# Patient Record
Sex: FEMALE | Race: BLACK OR AFRICAN AMERICAN | NOT HISPANIC OR LATINO | Employment: FULL TIME | URBAN - METROPOLITAN AREA
[De-identification: names, ages, dates, MRNs, and addresses within clinical notes are randomized per-mention and may not be internally consistent; named-entity substitution may affect disease eponyms.]

---

## 2019-05-24 ENCOUNTER — HOSPITAL ENCOUNTER (OUTPATIENT)
Facility: HOSPITAL | Age: 39
Discharge: HOME | End: 2019-05-25
Attending: FAMILY MEDICINE | Admitting: OBSTETRICS & GYNECOLOGY
Payer: COMMERCIAL

## 2019-05-24 DIAGNOSIS — D62 ACUTE BLOOD LOSS ANEMIA: ICD-10-CM

## 2019-05-24 DIAGNOSIS — N93.9 VAGINAL BLEEDING: ICD-10-CM

## 2019-05-24 DIAGNOSIS — O02.1 ABORTION, MISSED: Primary | ICD-10-CM

## 2019-05-24 PROCEDURE — 99285 EMERGENCY DEPT VISIT HI MDM: CPT | Mod: 25

## 2019-05-25 VITALS
OXYGEN SATURATION: 99 % | DIASTOLIC BLOOD PRESSURE: 63 MMHG | BODY MASS INDEX: 28.35 KG/M2 | HEART RATE: 75 BPM | WEIGHT: 160 LBS | RESPIRATION RATE: 18 BRPM | TEMPERATURE: 98 F | HEIGHT: 63 IN | SYSTOLIC BLOOD PRESSURE: 116 MMHG

## 2019-05-25 PROBLEM — N93.9 VAGINAL BLEEDING: Status: ACTIVE | Noted: 2019-05-25

## 2019-05-25 PROBLEM — D62 ACUTE BLOOD LOSS ANEMIA: Status: ACTIVE | Noted: 2019-05-25

## 2019-05-25 PROBLEM — O02.1 ABORTION, MISSED: Status: ACTIVE | Noted: 2019-05-25

## 2019-05-25 LAB
ABO + RH BLD: NORMAL
BASOPHILS # BLD AUTO: 0.04 K/UL (ref 0–0.2)
BASOPHILS NFR BLD: 0.5 % (ref 0–1.9)
BLD GP AB SCN CELLS X3 SERPL QL: NORMAL
DIFFERENTIAL METHOD: ABNORMAL
EOSINOPHIL # BLD AUTO: 0.1 K/UL (ref 0–0.5)
EOSINOPHIL NFR BLD: 1.5 % (ref 0–8)
ERYTHROCYTE [DISTWIDTH] IN BLOOD BY AUTOMATED COUNT: 13.6 % (ref 11.5–14.5)
HCG INTACT+B SERPL-ACNC: 511 MIU/ML
HCG INTACT+B SERPL-ACNC: 829 MIU/ML
HCT VFR BLD AUTO: 32.3 % (ref 37–48.5)
HCT VFR BLD AUTO: 36.7 % (ref 37–48.5)
HCT VFR BLD AUTO: 38 % (ref 37–48.5)
HGB BLD-MCNC: 10.2 G/DL (ref 12–16)
HGB BLD-MCNC: 11.7 G/DL (ref 12–16)
HGB BLD-MCNC: 12.1 G/DL (ref 12–16)
IMM GRANULOCYTES # BLD AUTO: 0.01 K/UL (ref 0–0.04)
IMM GRANULOCYTES NFR BLD AUTO: 0.1 % (ref 0–0.5)
LYMPHOCYTES # BLD AUTO: 2.1 K/UL (ref 1–4.8)
LYMPHOCYTES NFR BLD: 28.5 % (ref 18–48)
MCH RBC QN AUTO: 28 PG (ref 27–31)
MCHC RBC AUTO-ENTMCNC: 31.9 G/DL (ref 32–36)
MCV RBC AUTO: 88 FL (ref 82–98)
MONOCYTES # BLD AUTO: 0.6 K/UL (ref 0.3–1)
MONOCYTES NFR BLD: 8.5 % (ref 4–15)
NEUTROPHILS # BLD AUTO: 4.4 K/UL (ref 1.8–7.7)
NEUTROPHILS NFR BLD: 60.9 % (ref 38–73)
NRBC BLD-RTO: 0 /100 WBC
PLATELET # BLD AUTO: 281 K/UL (ref 150–350)
PMV BLD AUTO: 10.6 FL (ref 9.2–12.9)
RBC # BLD AUTO: 4.18 M/UL (ref 4–5.4)
WBC # BLD AUTO: 7.3 K/UL (ref 3.9–12.7)

## 2019-05-25 PROCEDURE — G0378 HOSPITAL OBSERVATION PER HR: HCPCS

## 2019-05-25 PROCEDURE — 84702 CHORIONIC GONADOTROPIN TEST: CPT

## 2019-05-25 PROCEDURE — 96360 HYDRATION IV INFUSION INIT: CPT

## 2019-05-25 PROCEDURE — 84702 CHORIONIC GONADOTROPIN TEST: CPT | Mod: 91

## 2019-05-25 PROCEDURE — 85014 HEMATOCRIT: CPT

## 2019-05-25 PROCEDURE — 85025 COMPLETE CBC W/AUTO DIFF WBC: CPT

## 2019-05-25 PROCEDURE — 25000003 PHARM REV CODE 250: Performed by: FAMILY MEDICINE

## 2019-05-25 PROCEDURE — 85018 HEMOGLOBIN: CPT

## 2019-05-25 PROCEDURE — 86850 RBC ANTIBODY SCREEN: CPT

## 2019-05-25 PROCEDURE — 96361 HYDRATE IV INFUSION ADD-ON: CPT

## 2019-05-25 RX ORDER — OXYCODONE AND ACETAMINOPHEN 7.5; 325 MG/1; MG/1
1 TABLET ORAL EVERY 4 HOURS PRN
Qty: 20 TABLET | Refills: 0 | Status: SHIPPED | OUTPATIENT
Start: 2019-05-25 | End: 2020-05-24

## 2019-05-25 RX ORDER — MISOPROSTOL 200 UG/1
200 TABLET ORAL EVERY 4 HOURS
Qty: 12 TABLET | Refills: 0 | Status: SHIPPED | OUTPATIENT
Start: 2019-05-25 | End: 2020-12-15

## 2019-05-25 RX ORDER — SODIUM CHLORIDE 9 MG/ML
INJECTION, SOLUTION INTRAVENOUS CONTINUOUS
Status: DISCONTINUED | OUTPATIENT
Start: 2019-05-25 | End: 2019-05-25 | Stop reason: HOSPADM

## 2019-05-25 RX ORDER — ONDANSETRON 2 MG/ML
4 INJECTION INTRAMUSCULAR; INTRAVENOUS EVERY 8 HOURS PRN
Status: DISCONTINUED | OUTPATIENT
Start: 2019-05-25 | End: 2019-05-25 | Stop reason: HOSPADM

## 2019-05-25 RX ORDER — HYDROMORPHONE HYDROCHLORIDE 2 MG/ML
0.5 INJECTION, SOLUTION INTRAMUSCULAR; INTRAVENOUS; SUBCUTANEOUS
Status: DISCONTINUED | OUTPATIENT
Start: 2019-05-25 | End: 2019-05-25

## 2019-05-25 RX ORDER — HYDROMORPHONE HYDROCHLORIDE 2 MG/ML
0.5 INJECTION, SOLUTION INTRAMUSCULAR; INTRAVENOUS; SUBCUTANEOUS EVERY 4 HOURS PRN
Status: DISCONTINUED | OUTPATIENT
Start: 2019-05-25 | End: 2019-05-25 | Stop reason: HOSPADM

## 2019-05-25 RX ORDER — DOXYCYCLINE 100 MG/1
100 CAPSULE ORAL EVERY 12 HOURS
Qty: 14 CAPSULE | Refills: 1 | Status: SHIPPED | OUTPATIENT
Start: 2019-05-25 | End: 2019-06-01

## 2019-05-25 RX ADMIN — SODIUM CHLORIDE: 9 INJECTION, SOLUTION INTRAVENOUS at 03:05

## 2019-05-25 NOTE — ED NOTES
Dr Dennis at bedside. Pelvic exam performed with KEREN Hester RN present. Vaginal bleeding noted with clots. Pt tolerated well.

## 2019-05-25 NOTE — ED NOTES
Dr Dennis at bedside for repeat pelvic exam. Vaginal bleeding still noted with several clots. Tolerated well.

## 2019-05-25 NOTE — H&P
[facility]  Obstetrics & Gynecology       Obsevation note    Patient Name: Emanuel Callaway  MRN: [id number]  Admission Date: 2019  Primary Care Provider: Primary Doctor No    Subjective:     Chief Complaint/Reason for Admission:  Early pregnancy with vaginal bleeding    History of Present Illness:  38-year-old  with an IUP at approximately 8 weeks by LMP presents to the emergency room with complaints of heavy vaginal bleeding. The patient was [facility] in early April and told that she was pregnant.  She did not have an ultrasound.  Ultrasound in the ER showed an early gestational sac with no fetal pole.  The patient was observed throughout the night.  Her bleeding had significantly improved.    No current facility-administered medications on file prior to encounter.      No current outpatient medications on file prior to encounter.       Review of patient's allergies indicates:  No Known Allergies    Past medical history:  Chronic hypertension.    Past Surgical History:   Procedure Laterality Date     SECTION       Family History     None        Tobacco Use    Smoking status: Never Smoker   Substance and Sexual Activity    Alcohol use: Yes     Comment: socially    Drug use: Not on file    Sexual activity: Not on file     Review of Systems   Constitutional: Negative.    HENT: Negative.    Eyes: Negative.    Respiratory: Negative.    Cardiovascular: Negative.    Gastrointestinal: Negative.    Endocrine: Negative.    Genitourinary: Positive for vaginal bleeding.   Musculoskeletal: Negative.    Integumentary:  Negative.   Neurological: Negative.    Hematological: Negative.    Psychiatric/Behavioral: Negative.    Breast: negative.      Objective:     Vital Signs (Most Recent):  Temp: 97.8 °F (36.6 °C) (19)  Pulse: 79 (19)  Resp: 16 (19)  BP: 131/89 (19)  SpO2: 99 % (19) Vital Signs (24h  Range):  Temp:  [97.8 °F (36.6 °C)-98 °F (36.7 °C)] 97.8 °F (36.6 °C)  Pulse:  [] 79  Resp:  [16-18] 16  SpO2:  [97 %-99 %] 99 %  BP: (131-137)/(89-97) 131/89     Weight: 72.6 kg (160 lb)  Body mass index is 28.34 kg/m².  No LMP recorded (lmp unknown). Patient is pregnant.    Physical Exam:   Constitutional: She is oriented to person, place, and time. She appears well-developed and well-nourished.    HENT:   Head: Normocephalic and atraumatic.   Right Ear: External ear normal.   Left Ear: External ear normal.   Nose: Nose normal.   Mouth/Throat: Oropharynx is clear and moist.    Eyes: Pupils are equal, round, and reactive to light. Conjunctivae and EOM are normal.    Neck: Normal range of motion. Neck supple.    Cardiovascular: Normal rate, regular rhythm, normal heart sounds and intact distal pulses.     Pulmonary/Chest: Effort normal and breath sounds normal.        Abdominal: Soft. Bowel sounds are normal.     Genitourinary: Uterus normal. Pelvic exam was performed with patient prone. Cervix is normal. Right adnexum displays no mass, no tenderness and no fullness. Left adnexum displays no mass, no tenderness and no fullness. There is bleeding in the vagina.   Genitourinary Comments: The cervix is closed.  There is no cervical motion tenderness. There is no uterine fundal tenderness. There is minimal bleeding from the cervical os.           Musculoskeletal: Normal range of motion and moves all extremeties.       Neurological: She is alert and oriented to person, place, and time. She has normal reflexes.    Skin: Skin is warm and dry.    Psychiatric: She has a normal mood and affect. Her behavior is normal. Judgment and thought content normal.       Laboratory:  Beta HC / 511  A+ blood type  CBC:   Recent Labs   Lab 19  0016  19  0623   WBC 7.30  --   --    RBC 4.18  --   --    HGB 11.7*   < > 10.2*   HCT 36.7*   < > 32.3*     --   --    MCV 88  --   --    MCH 28.0  --   --    MCHC  31.9*  --   --     < > = values in this interval not displayed.     I have personallly reviewed all pertinent lab results from the last 24 hours.    Diagnostic Results:  US: Reviewed  Small intrauterine gestational sac noted.  No fetal heart tones    Assessment/Plan:     Active Diagnoses:    Diagnosis Date Noted POA    Vaginal bleeding [N93.9] 2019 Yes    , missed [O02.1] 2019 Yes      Problems Resolved During this Admission:       Discussed expectant management versus Cytotec versus D&C.  I will have patient return to clinic on Tuesday.  Patient has decided to proceed with Cytotec.  I will discharge her with vaginal Cytotec x2 doses then oral Cytotec.  I will also discharge her with a prescription for Percocet and doxycycline.  She is to return to my office on Tuesday for transvaginal ultrasound.  Bleeding precautions and fever precautions given.    Juanito Day MD  Obstetrics & Gynecology  [facility]

## 2019-05-25 NOTE — NURSING
Pt given d/c instructions. Pt understands when to follow up and what signs and symptoms to report to MD sooner. Pt understands how to take meds at home. Questions answered. Pt states she can have a ride around 1000am to pick her up for discharge. Pt instructions to call if she has any needs or questions.

## 2019-05-25 NOTE — ED PROVIDER NOTES
Encounter Date: 2019       History     Chief Complaint   Patient presents with    Vaginal Bleeding     reports vaginal bleeding with clots onset yesterday-increased today    Abdominal Pain     describes as cramping. reports being approximately 2 months pregnant     Patient presents the ED with having vaginal bleeding since around 5:00 p.m. today.  Patient's last menstrual period was approximately 2019, patient states that she is approximately 6-7 weeks pregnant.  She is a  AB1.  Has not establish prenatal care as of yet.  Patient states that cramping and spotting began yesterday but the bleeding became very heavy today.  Passing several large clots.  Denies any dizziness or faintness.        Review of patient's allergies indicates:  No Known Allergies  History reviewed. No pertinent past medical history.  Past Surgical History:   Procedure Laterality Date     SECTION       No family history on file.  Social History     Tobacco Use    Smoking status: Never Smoker   Substance Use Topics    Alcohol use: Yes     Comment: socially    Drug use: Not on file     Review of Systems   Constitutional: Negative.  Negative for fever.   HENT: Negative.    Eyes: Negative.    Respiratory: Negative.    Cardiovascular: Negative.    Gastrointestinal: Positive for abdominal pain.   Endocrine: Negative.    Genitourinary: Positive for vaginal bleeding.   Musculoskeletal: Negative.    Allergic/Immunologic: Negative.    Neurological: Negative.  Negative for dizziness, weakness and light-headedness.   Hematological: Negative.    Psychiatric/Behavioral: Negative.        Physical Exam     Initial Vitals [19 2338]   BP Pulse Resp Temp SpO2   (!) 137/97 96 18 98 °F (36.7 °C) 97 %      MAP       --         Physical Exam    Nursing note and vitals reviewed.  Constitutional: She appears well-developed and well-nourished. She is not diaphoretic. No distress.   HENT:   Head: Normocephalic and atraumatic.   Eyes:  Conjunctivae and EOM are normal. Pupils are equal, round, and reactive to light.   Neck: Normal range of motion. Neck supple.   Cardiovascular: Normal rate.   Pulmonary/Chest: Breath sounds normal. No respiratory distress. She has no wheezes. She has no rales.   Abdominal: Soft. Bowel sounds are normal. She exhibits no distension. There is no tenderness.   Genitourinary: Vagina normal.   Genitourinary Comments: Attempted to perform speculum exam, unable to visualize cervix due to brisk bleeding, large amount of blood and clots in vaginal vault.   Musculoskeletal: Normal range of motion. She exhibits no edema.   Neurological: She is alert and oriented to person, place, and time. She has normal strength.   Skin: Skin is warm and dry. Capillary refill takes less than 2 seconds. No rash noted. No erythema.   Psychiatric: She has a normal mood and affect. Her behavior is normal. Judgment and thought content normal.         ED Course   Procedures  Labs Reviewed   HCG, QUANTITATIVE, PREGNANCY   CBC W/ AUTO DIFFERENTIAL          Imaging Results    None                            ED Course as of May 25 0419   Sat May 25, 2019   0140 Spoke with Dr. Day, will admit, do serial H&H, repeat HCG and NPO in anticipation of D&C later this am.     [MD]      ED Course User Index  [MD] Sarah Dennis MD     Clinical Impression:       ICD-10-CM ICD-9-CM   1. Vaginal bleeding N93.9 623.8                                Sarah Dennis MD  05/25/19 2077

## 2019-05-25 NOTE — ED NOTES
Pt here with complains of abdominal cramping and heavy vaginal bleeding onset 1730. Pt reports being approximately 2 months pregnant. OB appointment scheduled for Tuesday. Pt awake and alert. Respirations non labored. Skin warm and dry.

## 2019-05-25 NOTE — DISCHARGE INSTRUCTIONS
Notify your healthcare provider if you experience: temperature greater than 100.4, increased vaginal bleeding with clots, lightheadedness, or dizziness.      Cytotec: Place one tablet vaginally and then repeat vaginal dose 4 hours later. After those two doses, starting taking 1 tablet orally for a total of 12 doses.   For pain, you may also take 800mg of Ibuprofen every 8 hours as needed for pain.

## 2019-05-26 NOTE — DISCHARGE SUMMARY
[facility]  Obstetrics & Gynecology  Discharge Summary    Patient Name: Emanuel Callaway  MRN: [id number]  Admission Date: 2019  Hospital Length of Stay: 0 days  Discharge Date and Time: 2019 11:45 AM  Attending Physician: No att. providers found   Discharging Provider: Juanito Day MD  Primary Care Provider: Primary Doctor Masha    HPI:  38-year-old  with an IUP at 8 weeks presented with heavy vaginal bleeding.  A transvaginal ultrasound revealed an empty gestational sac that was intrauterine.  The patient had serial beta HCGs drawn.  There were noted to be declining.  The patient was diagnosed with a missed AB.  Hospital Course:  The patient was placed in observation.  Repeat beta HCGs were drawn as well as a CBC.  The patient's hematocrit noted to decrease somewhat.  She was counseled on the diagnosis.  The patient decided to proceed with medical treatment with Cytotec.  She was given fever and bleeding precautions.    * No surgery found *     Consults:     Significant Diagnostic Studies: Labs:   CBC   Recent Labs   Lab 19  0016 19  0158 19  0623   WBC 7.30  --   --    HGB 11.7* 12.1 10.2*   HCT 36.7* 38.0 32.3*     --   --     and All labs within the past 24 hours have been reviewed  Radiology: Ultrasound:  Gestational sac with no fetal pole.    Pending Diagnostic Studies:     None        Final Active Diagnoses:    Diagnosis Date Noted POA    PRINCIPAL PROBLEM:  , missed [O02.1] 2019 Yes    Vaginal bleeding [N93.9] 2019 Yes    Acute blood loss anemia [D62] 2019 Yes      Problems Resolved During this Admission:        Discharged Condition: good    Disposition: Home or Self Care    Follow Up:  Follow-up Information     Juanito Day MD.    Specialty:  Obstetrics  Why:  On Tuesday for transvaginal ultrasound  Contact information:  [facility] [facility] [address] [phone]                  Patient Instructions:      Other restrictions (specify):   Order Comments: Pelvic rest: no sex tampons or douching     Notify your health care provider if you experience any of the following:  temperature >100.4     Notify your health care provider if you experience any of the following:  persistent nausea and vomiting or diarrhea     Notify your health care provider if you experience any of the following:  severe persistent headache     Notify your health care provider if you experience any of the following:  persistent dizziness, light-headedness, or visual disturbances     Notify your health care provider if you experience any of the following:   Order Comments: 1.  Unusual vaginal discharge.  2.  Pain or burning with urination  3.  Swelling of face, hands, legs,etc that occurs suddenly     Medications:  Reconciled Home Medications:      Medication List      START taking these medications    doxycycline 100 MG capsule  Commonly known as:  MONODOX  Take 1 capsule (100 mg total) by mouth every 12 (twelve) hours. MAY REFILL ONCE for 7 days     miSOPROStol 200 MCG Tab  Commonly known as:  CYTOTEC  Place 1 tablet (200 mcg total) vaginally every 4 (four) hours. Place the 1st 2 tablets inside the vagina and then continue with taking the tablets orally after that. for 12 doses     oxyCODONE-acetaminophen 7.5-325 mg per tablet  Commonly known as:  PERCOCET  Take 1 tablet by mouth every 4 (four) hours as needed for Pain.            Juanito Day MD  Obstetrics & Gynecology  [facility]

## 2019-12-15 ENCOUNTER — HOSPITAL ENCOUNTER (EMERGENCY)
Facility: HOSPITAL | Age: 39
Discharge: HOME | End: 2019-12-15
Attending: EMERGENCY MEDICINE
Payer: COMMERCIAL

## 2019-12-15 VITALS
OXYGEN SATURATION: 99 % | HEART RATE: 100 BPM | HEIGHT: 63 IN | BODY MASS INDEX: 31.54 KG/M2 | DIASTOLIC BLOOD PRESSURE: 77 MMHG | RESPIRATION RATE: 20 BRPM | WEIGHT: 178 LBS | SYSTOLIC BLOOD PRESSURE: 108 MMHG | TEMPERATURE: 98 F

## 2019-12-15 DIAGNOSIS — N30.90 CYSTITIS: Primary | ICD-10-CM

## 2019-12-15 DIAGNOSIS — J32.9 SINUSITIS, UNSPECIFIED CHRONICITY, UNSPECIFIED LOCATION: ICD-10-CM

## 2019-12-15 PROBLEM — K30 INDIGESTION: Status: ACTIVE | Noted: 2019-12-15

## 2019-12-15 PROBLEM — F43.0 ACUTE STRESS DISORDER: Status: ACTIVE | Noted: 2019-12-15

## 2019-12-15 PROBLEM — F32.A DEPRESSIVE DISORDER: Status: ACTIVE | Noted: 2019-12-15

## 2019-12-15 PROBLEM — I10 HYPERTENSION: Status: ACTIVE | Noted: 2019-12-15

## 2019-12-15 LAB
BACTERIA #/AREA URNS HPF: ABNORMAL /HPF
BILIRUB UR QL STRIP: NEGATIVE
CLARITY UR: ABNORMAL
COLOR UR: YELLOW
GLUCOSE UR QL STRIP: NEGATIVE
HGB UR QL STRIP: ABNORMAL
KETONES UR QL STRIP: NEGATIVE
LEUKOCYTE ESTERASE UR QL STRIP: ABNORMAL
MICROSCOPIC COMMENT: ABNORMAL
NITRITE UR QL STRIP: NEGATIVE
PH UR STRIP: 5 [PH] (ref 5–8)
PROT UR QL STRIP: NEGATIVE
RBC #/AREA URNS HPF: 10 /HPF (ref 0–4)
SP GR UR STRIP: 1.02 (ref 1–1.03)
URN SPEC COLLECT METH UR: ABNORMAL
UROBILINOGEN UR STRIP-ACNC: NEGATIVE EU/DL
WBC #/AREA URNS HPF: 35 /HPF (ref 0–5)

## 2019-12-15 PROCEDURE — 87077 CULTURE AEROBIC IDENTIFY: CPT

## 2019-12-15 PROCEDURE — 81000 URINALYSIS NONAUTO W/SCOPE: CPT

## 2019-12-15 PROCEDURE — 87186 SC STD MICRODIL/AGAR DIL: CPT

## 2019-12-15 PROCEDURE — 87086 URINE CULTURE/COLONY COUNT: CPT

## 2019-12-15 PROCEDURE — 63600175 PHARM REV CODE 636 W HCPCS: Performed by: NURSE PRACTITIONER

## 2019-12-15 PROCEDURE — 96372 THER/PROPH/DIAG INJ SC/IM: CPT

## 2019-12-15 PROCEDURE — 87088 URINE BACTERIA CULTURE: CPT

## 2019-12-15 PROCEDURE — 99284 EMERGENCY DEPT VISIT MOD MDM: CPT | Mod: 25

## 2019-12-15 RX ORDER — DOXYCYCLINE 100 MG/1
100 CAPSULE ORAL 2 TIMES DAILY
Qty: 20 CAPSULE | Refills: 0 | Status: SHIPPED | OUTPATIENT
Start: 2019-12-15 | End: 2019-12-25

## 2019-12-15 RX ORDER — KETOROLAC TROMETHAMINE 30 MG/ML
60 INJECTION, SOLUTION INTRAMUSCULAR; INTRAVENOUS
Status: COMPLETED | OUTPATIENT
Start: 2019-12-15 | End: 2019-12-15

## 2019-12-15 RX ADMIN — KETOROLAC TROMETHAMINE 60 MG: 30 INJECTION, SOLUTION INTRAMUSCULAR at 05:12

## 2019-12-15 NOTE — ED PROVIDER NOTES
CHIEF COMPLAINT  Chief Complaint   Patient presents with    Dysuria     uti symptoms and congestion       HPI  Emanuel Tucker a 39 y.o. female who presents urinary urgency and frequency for 2 days. Does not think she has had any fever. No vaginal discharge.   Sinus congestion and pressure for a few days. No fever/chills. Has taken OTC meds with no improvement in symptoms.      CURRENT MEDICATIONS  No current facility-administered medications on file prior to encounter.      Current Outpatient Medications on File Prior to Encounter   Medication Sig Dispense Refill    DULoxetine 60 mg CDRS duloxetine 60 mg capsule,delayed release      miSOPROStol (CYTOTEC) 200 MCG Tab Place 1 tablet (200 mcg total) vaginally every 4 (four) hours. Place the 1st 2 tablets inside the vagina and then continue with taking the tablets orally after that. for 12 doses 12 tablet 0    oxyCODONE-acetaminophen (PERCOCET) 7.5-325 mg per tablet Take 1 tablet by mouth every 4 (four) hours as needed for Pain. 20 tablet 0       ALLERGIES  Review of patient's allergies indicates:  No Known Allergies    Immunization History   Administered Date(s) Administered    Tdap 2018       PAST MEDICAL HISTORY  History reviewed. No pertinent past medical history.    SURGICAL HISTORY  Past Surgical History:   Procedure Laterality Date     SECTION         SOCIAL HISTORY  Social History     Socioeconomic History    Marital status: Single     Spouse name: Not on file    Number of children: Not on file    Years of education: Not on file    Highest education level: Not on file   Occupational History    Not on file   Social Needs    Financial resource strain: Not on file    Food insecurity:     Worry: Not on file     Inability: Not on file    Transportation needs:     Medical: Not on file     Non-medical: Not on file   Tobacco Use    Smoking status: Never Smoker   Substance and Sexual Activity    Alcohol use: Yes     Comment: socially     "Drug use: Not on file    Sexual activity: Not on file   Lifestyle    Physical activity:     Days per week: Not on file     Minutes per session: Not on file    Stress: Not on file   Relationships    Social connections:     Talks on phone: Not on file     Gets together: Not on file     Attends Faith service: Not on file     Active member of club or organization: Not on file     Attends meetings of clubs or organizations: Not on file     Relationship status: Not on file   Other Topics Concern    Not on file   Social History Narrative    Not on file       FAMILY HISTORY  History reviewed. No pertinent family history.    REVIEW OF SYSTEMS  Constitutional: No fever, chills, or weakness.  Eyes: No redness, pain, or discharge  HENT: No ear pain, no headache, no rhinorrhea, no throat pain  Respiratory: No cough, wheezing or shortness of breath  Cardiovascular: No chest pain, palpitations or edema  GI: No abdominal pain, nausea, vomiting or diarrhea  Gu: + dysuria, +  Hematuria, + frequency, no discharge  Musculoskeletal: No pain, full range of motion. Good sensation  Skin: No rash or abrasion  Neurologic: No focal weakness or sensory changes.  All systems otherwise negative except as noted in the Review of Systems and History of Present Illness      PHYSICAL EXAM  Reviewed Triage Note  VITAL SIGNS:   Patient Vitals for the past 24 hrs:   BP Temp Pulse Resp SpO2 Height Weight   12/15/19 1625 108/77 98.4 °F (36.9 °C) 100 20 99 % 5' 3" (1.6 m) 80.7 kg (178 lb)     Constitutional: Well developed, well nourished, Alert and oriented x3, No acute distress, non-toxic appearance.  HENT: Normocephalic, Atraumatic, TM's full/dull bilaterally. Pharynx with purulent PND. + frontal and maxillary tenderness to palpation.   Eyes: PERRL, EOMI, Conjunctiva normal, No discharge.  Neck: Normal range of motion, no tenderness, supple  Respiratory: Normal breath sounds, no respiratory distress, no wheezing, no rhonchi, no " rales  Cardiovascular: Normal heart rate, normal rhythm, no murmurs, no rubs, no gallops.  Gi: Bowel sounds normal, soft, no tenderness, non-distended, no masses  Musculoskeletal: No edema, no tenderness, no cyanosis, no clubbing. Good range of motion in all major joints. No tenderness to palpation or major deformities noted.   Integument: Warm, Dry, No erythema, no rash  Neurologic: Normal motor function, normal sensory function. No focal deficits noted. I  Psychiatric: Affect normal, judgment normal, mood normal      LABS  Pertinent labs reviewed. (see chart for details)  Labs Reviewed   URINALYSIS, REFLEX TO URINE CULTURE - Abnormal; Notable for the following components:       Result Value    Appearance, UA Hazy (*)     Occult Blood UA 3+ (*)     Leukocytes, UA 1+ (*)     All other components within normal limits    Narrative:     Preferred Collection Type->Urine, Clean Catch   URINALYSIS MICROSCOPIC - Abnormal; Notable for the following components:    RBC, UA 10 (*)     WBC, UA 35 (*)     Bacteria Few (*)     All other components within normal limits    Narrative:     Preferred Collection Type->Urine, Clean Catch   CULTURE, URINE       RADIOLOGY  No orders to display         PROCEDURE  Procedures      ED COURSE & MEDICAL DECISION MAKING     MDM       Physical exam findings discussed with patient. No acute emergent medical condition identified at this time to warrant further testing. Will dispo home with instructions to follow up with PCP, return to the ED for worsening condition. Pt agrees with plan of care.     DISPOSITION  Patient discharged in stable condition 12/15/2019  5:51 PM      CLINICAL IMPRESSION:  The primary encounter diagnosis was Cystitis. A diagnosis of Sinusitis, unspecified chronicity, unspecified location was also pertinent to this visit.    Patient advised to follow-up with your PCP within 3 days for BP re-check if Blood Pressure was >120/80 without history of hypertension.       Justa REAGAN  Omar, RUSSELL  12/15/19 8832

## 2019-12-18 LAB — BACTERIA UR CULT: ABNORMAL

## 2020-12-10 ENCOUNTER — TELEPHONE (OUTPATIENT)
Dept: FAMILY MEDICINE | Facility: CLINIC | Age: 40
End: 2020-12-10

## 2020-12-10 DIAGNOSIS — R53.83 FATIGUE, UNSPECIFIED TYPE: Primary | ICD-10-CM

## 2020-12-10 DIAGNOSIS — Z00.00 ROUTINE GENERAL MEDICAL EXAMINATION AT A HEALTH CARE FACILITY: ICD-10-CM

## 2020-12-15 ENCOUNTER — OFFICE VISIT (OUTPATIENT)
Dept: FAMILY MEDICINE | Facility: CLINIC | Age: 40
End: 2020-12-15
Payer: COMMERCIAL

## 2020-12-15 VITALS
SYSTOLIC BLOOD PRESSURE: 122 MMHG | TEMPERATURE: 98 F | DIASTOLIC BLOOD PRESSURE: 80 MMHG | HEIGHT: 64 IN | WEIGHT: 184.38 LBS | OXYGEN SATURATION: 99 % | BODY MASS INDEX: 31.48 KG/M2 | HEART RATE: 109 BPM

## 2020-12-15 DIAGNOSIS — I10 ESSENTIAL HYPERTENSION: ICD-10-CM

## 2020-12-15 DIAGNOSIS — Z00.00 WELLNESS EXAMINATION: ICD-10-CM

## 2020-12-15 DIAGNOSIS — B37.31 VAGINAL CANDIDIASIS: ICD-10-CM

## 2020-12-15 DIAGNOSIS — G47.00 INSOMNIA, UNSPECIFIED TYPE: Primary | ICD-10-CM

## 2020-12-15 DIAGNOSIS — R53.83 FATIGUE, UNSPECIFIED TYPE: ICD-10-CM

## 2020-12-15 PROCEDURE — 1126F PR PAIN SEVERITY QUANTIFIED, NO PAIN PRESENT: ICD-10-PCS | Mod: S$GLB,,,

## 2020-12-15 PROCEDURE — 1126F AMNT PAIN NOTED NONE PRSNT: CPT | Mod: S$GLB,,,

## 2020-12-15 PROCEDURE — 3008F BODY MASS INDEX DOCD: CPT | Mod: S$GLB,,,

## 2020-12-15 PROCEDURE — 99214 PR OFFICE/OUTPT VISIT, EST, LEVL IV, 30-39 MIN: ICD-10-PCS | Mod: S$GLB,,,

## 2020-12-15 PROCEDURE — 99214 OFFICE O/P EST MOD 30 MIN: CPT | Mod: S$GLB,,,

## 2020-12-15 PROCEDURE — 3008F PR BODY MASS INDEX (BMI) DOCUMENTED: ICD-10-PCS | Mod: S$GLB,,,

## 2020-12-15 RX ORDER — DULOXETIN HYDROCHLORIDE 60 MG/1
CAPSULE, DELAYED RELEASE ORAL
COMMUNITY
End: 2021-03-22 | Stop reason: SDUPTHER

## 2020-12-15 RX ORDER — DOXEPIN HYDROCHLORIDE 50 MG/1
CAPSULE ORAL
COMMUNITY
End: 2022-05-13

## 2020-12-15 RX ORDER — CYPROHEPTADINE HYDROCHLORIDE 4 MG/1
TABLET ORAL
COMMUNITY
End: 2021-03-22

## 2020-12-15 RX ORDER — FLUCONAZOLE 150 MG/1
TABLET ORAL
Qty: 2 TABLET | Refills: 0 | Status: SHIPPED | OUTPATIENT
Start: 2020-12-15 | End: 2021-03-22

## 2020-12-15 RX ORDER — AMLODIPINE BESYLATE 10 MG/1
TABLET ORAL
COMMUNITY
End: 2021-03-22 | Stop reason: SDUPTHER

## 2020-12-15 NOTE — PROGRESS NOTES
Subjective:       Patient ID: Emanuel Callaway is a 40 y.o. female.    Chief Complaint: Follow-up ( pt forgot to do labs)    Emanuel Callaway is an established patient to [facility].   She is a new patient to me.   Her last visit was on 10/16/2020 for a well visit/ physical for [employer] program where she works   She did not have her labs completed.   When she presented to the clinic on 10/16/2020 she reported complaints of difficulty sleeping.   She was on Trazodone, but reported that it was not working well for sleep- reports she will fall asleep, but will wake up in 2-2/5 hours and feel like she has not taken anything.   She was given Doxepin 50 mg capsule to try. She reports she stopped taking it because she felt that it was not working.  She reports that she would take it around 7 pm and it was kicking in around her bedtime at 9pm. She states she was sleeping around 4-5 hours at night. She reports when she woke up she would be up for <30 minutes and was able to fall back asleep. Reports she co-sleeps with her son.     She reports vaginal itching, odor and cottage cheese discharge for the past few days. Has not tried anything otc for her symptoms         Review of Systems   Constitutional: Negative for activity change, appetite change, chills, diaphoresis, fatigue, fever and unexpected weight change.   HENT: Negative for congestion, ear discharge, ear pain, facial swelling, hearing loss, mouth sores, nosebleeds, postnasal drip, rhinorrhea, sinus pressure, sinus pain, sneezing, sore throat, tinnitus, trouble swallowing and voice change.    Eyes: Negative.  Negative for pain, discharge and visual disturbance.   Respiratory: Negative for apnea, chest tightness, shortness of breath and wheezing.    Cardiovascular: Negative for chest pain, palpitations and leg swelling.   Gastrointestinal: Negative for abdominal distention, abdominal pain, blood in stool, constipation, nausea and vomiting.   Endocrine:  Negative for cold intolerance, heat intolerance and polyuria.   Genitourinary: Positive for vaginal discharge. Negative for difficulty urinating, frequency, pelvic pain, vaginal bleeding and vaginal pain.   Musculoskeletal: Negative for arthralgias, back pain, joint swelling, neck pain and neck stiffness.   Skin: Negative for color change, pallor, rash and wound.   Allergic/Immunologic: Negative for environmental allergies, food allergies and immunocompromised state.   Neurological: Negative for dizziness, tremors, seizures, syncope, speech difficulty, weakness and headaches.   Hematological: Does not bruise/bleed easily.   Psychiatric/Behavioral: Positive for sleep disturbance. Negative for agitation, behavioral problems, confusion and hallucinations. The patient is not nervous/anxious and is not hyperactive.        Patient Active Problem List   Diagnosis    Vaginal bleeding    , missed    Acute blood loss anemia    Acute stress disorder    BV (bacterial vaginosis)    Depressive disorder    HSV (herpes simplex virus) infection    Hypertension    Indigestion    Essential hypertension    Insomnia       Objective:      Physical Exam  Vitals signs and nursing note reviewed.   Constitutional:       Appearance: Normal appearance.   HENT:      Head: Normocephalic and atraumatic.      Nose: Nose normal.      Mouth/Throat:      Mouth: Mucous membranes are moist.   Eyes:      Pupils: Pupils are equal, round, and reactive to light.   Neck:      Musculoskeletal: Normal range of motion and neck supple.   Cardiovascular:      Rate and Rhythm: Normal rate and regular rhythm.      Pulses: Normal pulses.   Pulmonary:      Effort: Pulmonary effort is normal.      Breath sounds: Normal breath sounds.   Abdominal:      General: Abdomen is flat. Bowel sounds are normal.   Musculoskeletal: Normal range of motion.      Right lower leg: No edema.      Left lower leg: No edema.   Skin:     General: Skin is warm and dry.       Capillary Refill: Capillary refill takes less than 2 seconds.   Neurological:      General: No focal deficit present.      Mental Status: She is alert.   Psychiatric:         Attention and Perception: Attention and perception normal.         Mood and Affect: Mood normal.         Speech: Speech normal.         Behavior: Behavior normal.         Thought Content: Thought content normal.         Cognition and Memory: Cognition and memory normal.         Lab Results   Component Value Date    WBC 7.30 05/25/2019    HGB 10.2 (L) 05/25/2019    HCT 32.3 (L) 05/25/2019     05/25/2019     The ASCVD Risk score (Belia URBINA Jr., et al., 2013) failed to calculate for the following reasons:    The 2013 ASCVD risk score is only valid for ages 40 to 79    Assessment:       1. Insomnia, unspecified type    2. Essential hypertension    3. Wellness examination    4. Fatigue, unspecified type    5. Vaginal candidiasis        Plan:       Emanuel was seen today for follow-up.    Diagnoses and all orders for this visit:    Insomnia, unspecified type  Continue Doxepin: Trial of increasing to 2 capsules at bedtime- try this for the next 2 weeks. Notify office if this is helping with sleep so new rx can be submitted.   Essential hypertension  -     Lipid Panel; Future  -     Comprehensive Metabolic Panel; Future    Wellness examination  -     Lipid Panel; Future  -     Comprehensive Metabolic Panel; Future    Fatigue, unspecified type  -     TSH; Future  -     CBC Auto Differential; Future    Vaginal candidiasis  Diflucan as instructed  Cotton underwear  Good sis care discussed  Schedule pelvic/pap smear with GYN    Obtain fasting labs   rtc 3 months routine follow up     No follow-ups on file.

## 2020-12-24 ENCOUNTER — LAB VISIT (OUTPATIENT)
Dept: LAB | Facility: CLINIC | Age: 40
End: 2020-12-24
Payer: COMMERCIAL

## 2020-12-24 DIAGNOSIS — R53.83 FATIGUE, UNSPECIFIED TYPE: ICD-10-CM

## 2020-12-24 DIAGNOSIS — Z00.00 ROUTINE GENERAL MEDICAL EXAMINATION AT A HEALTH CARE FACILITY: ICD-10-CM

## 2020-12-24 LAB
ALBUMIN SERPL BCP-MCNC: 3.9 G/DL (ref 3.5–5.2)
ALP SERPL-CCNC: 64 U/L (ref 55–135)
ALT SERPL W/O P-5'-P-CCNC: 14 U/L (ref 10–44)
ANION GAP SERPL CALC-SCNC: 4 MMOL/L (ref 8–16)
AST SERPL-CCNC: 15 U/L (ref 10–40)
BASOPHILS # BLD AUTO: 0.05 K/UL (ref 0–0.2)
BASOPHILS NFR BLD: 1.2 % (ref 0–1.9)
BILIRUB SERPL-MCNC: 0.8 MG/DL (ref 0.1–1)
BUN SERPL-MCNC: 9 MG/DL (ref 6–20)
CALCIUM SERPL-MCNC: 9 MG/DL (ref 8.7–10.5)
CHLORIDE SERPL-SCNC: 104 MMOL/L (ref 95–110)
CHOLEST SERPL-MCNC: 187 MG/DL (ref 120–199)
CHOLEST/HDLC SERPL: 2.9 {RATIO} (ref 2–5)
CO2 SERPL-SCNC: 31 MMOL/L (ref 23–29)
CREAT SERPL-MCNC: 0.8 MG/DL (ref 0.5–1.4)
DIFFERENTIAL METHOD: ABNORMAL
EOSINOPHIL # BLD AUTO: 0.1 K/UL (ref 0–0.5)
EOSINOPHIL NFR BLD: 1.4 % (ref 0–8)
ERYTHROCYTE [DISTWIDTH] IN BLOOD BY AUTOMATED COUNT: 14 % (ref 11.5–14.5)
EST. GFR  (AFRICAN AMERICAN): >60 ML/MIN/1.73 M^2
EST. GFR  (NON AFRICAN AMERICAN): >60 ML/MIN/1.73 M^2
GLUCOSE SERPL-MCNC: 93 MG/DL (ref 70–110)
HCT VFR BLD AUTO: 38.4 % (ref 37–48.5)
HDLC SERPL-MCNC: 65 MG/DL (ref 40–75)
HDLC SERPL: 34.8 % (ref 20–50)
HGB BLD-MCNC: 12.1 G/DL (ref 12–16)
IMM GRANULOCYTES # BLD AUTO: 0 K/UL (ref 0–0.04)
IMM GRANULOCYTES NFR BLD AUTO: 0 % (ref 0–0.5)
LDLC SERPL CALC-MCNC: 110.2 MG/DL (ref 63–159)
LYMPHOCYTES # BLD AUTO: 1.7 K/UL (ref 1–4.8)
LYMPHOCYTES NFR BLD: 39.4 % (ref 18–48)
MCH RBC QN AUTO: 27.6 PG (ref 27–31)
MCHC RBC AUTO-ENTMCNC: 31.5 G/DL (ref 32–36)
MCV RBC AUTO: 88 FL (ref 82–98)
MONOCYTES # BLD AUTO: 0.4 K/UL (ref 0.3–1)
MONOCYTES NFR BLD: 9.2 % (ref 4–15)
NEUTROPHILS # BLD AUTO: 2.1 K/UL (ref 1.8–7.7)
NEUTROPHILS NFR BLD: 48.8 % (ref 38–73)
NONHDLC SERPL-MCNC: 122 MG/DL
NRBC BLD-RTO: 0 /100 WBC
PLATELET # BLD AUTO: 331 K/UL (ref 150–350)
PMV BLD AUTO: 10.7 FL (ref 9.2–12.9)
POTASSIUM SERPL-SCNC: 4.2 MMOL/L (ref 3.5–5.1)
PROT SERPL-MCNC: 7.8 G/DL (ref 6–8.4)
RBC # BLD AUTO: 4.39 M/UL (ref 4–5.4)
SODIUM SERPL-SCNC: 139 MMOL/L (ref 136–145)
TRIGL SERPL-MCNC: 59 MG/DL (ref 30–150)
TSH SERPL DL<=0.005 MIU/L-ACNC: 0.71 UIU/ML (ref 0.4–4)
WBC # BLD AUTO: 4.26 K/UL (ref 3.9–12.7)

## 2020-12-24 PROCEDURE — 85025 COMPLETE CBC W/AUTO DIFF WBC: CPT

## 2020-12-24 PROCEDURE — 80061 LIPID PANEL: CPT

## 2020-12-24 PROCEDURE — 80053 COMPREHEN METABOLIC PANEL: CPT

## 2020-12-24 PROCEDURE — 84443 ASSAY THYROID STIM HORMONE: CPT

## 2021-03-22 ENCOUNTER — OFFICE VISIT (OUTPATIENT)
Dept: FAMILY MEDICINE | Facility: CLINIC | Age: 41
End: 2021-03-22
Payer: COMMERCIAL

## 2021-03-22 VITALS
OXYGEN SATURATION: 99 % | HEIGHT: 64 IN | WEIGHT: 187.81 LBS | BODY MASS INDEX: 32.06 KG/M2 | TEMPERATURE: 98 F | DIASTOLIC BLOOD PRESSURE: 80 MMHG | SYSTOLIC BLOOD PRESSURE: 118 MMHG | HEART RATE: 86 BPM

## 2021-03-22 DIAGNOSIS — F32.A DEPRESSIVE DISORDER: Primary | ICD-10-CM

## 2021-03-22 DIAGNOSIS — F51.02 ADJUSTMENT INSOMNIA: ICD-10-CM

## 2021-03-22 DIAGNOSIS — I10 ESSENTIAL HYPERTENSION: ICD-10-CM

## 2021-03-22 PROCEDURE — 3008F PR BODY MASS INDEX (BMI) DOCUMENTED: ICD-10-PCS | Mod: S$GLB,,, | Performed by: NURSE PRACTITIONER

## 2021-03-22 PROCEDURE — 1126F AMNT PAIN NOTED NONE PRSNT: CPT | Mod: S$GLB,,, | Performed by: NURSE PRACTITIONER

## 2021-03-22 PROCEDURE — 3008F BODY MASS INDEX DOCD: CPT | Mod: S$GLB,,, | Performed by: NURSE PRACTITIONER

## 2021-03-22 PROCEDURE — 99212 PR OFFICE/OUTPT VISIT, EST, LEVL II, 10-19 MIN: ICD-10-PCS | Mod: S$GLB,,, | Performed by: NURSE PRACTITIONER

## 2021-03-22 PROCEDURE — 1126F PR PAIN SEVERITY QUANTIFIED, NO PAIN PRESENT: ICD-10-PCS | Mod: S$GLB,,, | Performed by: NURSE PRACTITIONER

## 2021-03-22 PROCEDURE — 99212 OFFICE O/P EST SF 10 MIN: CPT | Mod: S$GLB,,, | Performed by: NURSE PRACTITIONER

## 2021-03-22 RX ORDER — AMLODIPINE BESYLATE 10 MG/1
10 TABLET ORAL DAILY
Qty: 90 TABLET | Refills: 3 | Status: SHIPPED | OUTPATIENT
Start: 2021-03-22 | End: 2021-10-27 | Stop reason: SDUPTHER

## 2021-03-22 RX ORDER — DULOXETIN HYDROCHLORIDE 60 MG/1
60 CAPSULE, DELAYED RELEASE ORAL DAILY
Qty: 30 CAPSULE | Refills: 5 | Status: SHIPPED | OUTPATIENT
Start: 2021-03-22 | End: 2023-06-16

## 2021-10-27 DIAGNOSIS — I10 ESSENTIAL HYPERTENSION: ICD-10-CM

## 2021-10-27 RX ORDER — AMLODIPINE BESYLATE 10 MG/1
10 TABLET ORAL DAILY
Qty: 90 TABLET | Refills: 3 | Status: SHIPPED | OUTPATIENT
Start: 2021-10-27 | End: 2023-06-16 | Stop reason: SDUPTHER

## 2022-01-19 DIAGNOSIS — Z12.31 OTHER SCREENING MAMMOGRAM: ICD-10-CM

## 2022-05-13 ENCOUNTER — TELEPHONE (OUTPATIENT)
Dept: FAMILY MEDICINE | Facility: CLINIC | Age: 42
End: 2022-05-13

## 2022-05-13 ENCOUNTER — OFFICE VISIT (OUTPATIENT)
Dept: FAMILY MEDICINE | Facility: CLINIC | Age: 42
End: 2022-05-13
Payer: COMMERCIAL

## 2022-05-13 VITALS
OXYGEN SATURATION: 100 % | DIASTOLIC BLOOD PRESSURE: 80 MMHG | WEIGHT: 187.38 LBS | HEIGHT: 64 IN | HEART RATE: 99 BPM | BODY MASS INDEX: 31.99 KG/M2 | TEMPERATURE: 99 F | SYSTOLIC BLOOD PRESSURE: 122 MMHG

## 2022-05-13 DIAGNOSIS — R40.0 HAS DAYTIME DROWSINESS: ICD-10-CM

## 2022-05-13 DIAGNOSIS — G47.9 SLEEP DISTURBANCE: ICD-10-CM

## 2022-05-13 DIAGNOSIS — Z02.89 ENCOUNTER FOR PHYSICAL EXAMINATION RELATED TO EMPLOYMENT: Primary | ICD-10-CM

## 2022-05-13 PROCEDURE — 1159F MED LIST DOCD IN RCRD: CPT | Mod: S$GLB,,, | Performed by: NURSE PRACTITIONER

## 2022-05-13 PROCEDURE — 99214 OFFICE O/P EST MOD 30 MIN: CPT | Mod: S$GLB,,, | Performed by: NURSE PRACTITIONER

## 2022-05-13 PROCEDURE — 3079F PR MOST RECENT DIASTOLIC BLOOD PRESSURE 80-89 MM HG: ICD-10-PCS | Mod: S$GLB,,, | Performed by: NURSE PRACTITIONER

## 2022-05-13 PROCEDURE — 3079F DIAST BP 80-89 MM HG: CPT | Mod: S$GLB,,, | Performed by: NURSE PRACTITIONER

## 2022-05-13 PROCEDURE — 3008F BODY MASS INDEX DOCD: CPT | Mod: S$GLB,,, | Performed by: NURSE PRACTITIONER

## 2022-05-13 PROCEDURE — 99214 PR OFFICE/OUTPT VISIT, EST, LEVL IV, 30-39 MIN: ICD-10-PCS | Mod: S$GLB,,, | Performed by: NURSE PRACTITIONER

## 2022-05-13 PROCEDURE — 3074F PR MOST RECENT SYSTOLIC BLOOD PRESSURE < 130 MM HG: ICD-10-PCS | Mod: S$GLB,,, | Performed by: NURSE PRACTITIONER

## 2022-05-13 PROCEDURE — 1159F PR MEDICATION LIST DOCUMENTED IN MEDICAL RECORD: ICD-10-PCS | Mod: S$GLB,,, | Performed by: NURSE PRACTITIONER

## 2022-05-13 PROCEDURE — 99999 PR PBB SHADOW E&M-EST. PATIENT-LVL III: CPT | Mod: PBBFAC,,, | Performed by: NURSE PRACTITIONER

## 2022-05-13 PROCEDURE — 3074F SYST BP LT 130 MM HG: CPT | Mod: S$GLB,,, | Performed by: NURSE PRACTITIONER

## 2022-05-13 PROCEDURE — 1160F RVW MEDS BY RX/DR IN RCRD: CPT | Mod: S$GLB,,, | Performed by: NURSE PRACTITIONER

## 2022-05-13 PROCEDURE — 3008F PR BODY MASS INDEX (BMI) DOCUMENTED: ICD-10-PCS | Mod: S$GLB,,, | Performed by: NURSE PRACTITIONER

## 2022-05-13 PROCEDURE — 99999 PR PBB SHADOW E&M-EST. PATIENT-LVL III: ICD-10-PCS | Mod: PBBFAC,,, | Performed by: NURSE PRACTITIONER

## 2022-05-13 PROCEDURE — 1160F PR REVIEW ALL MEDS BY PRESCRIBER/CLIN PHARMACIST DOCUMENTED: ICD-10-PCS | Mod: S$GLB,,, | Performed by: NURSE PRACTITIONER

## 2022-05-13 RX ORDER — AMITRIPTYLINE HYDROCHLORIDE 25 MG/1
TABLET, FILM COATED ORAL
Qty: 60 TABLET | Refills: 3 | Status: SHIPPED | OUTPATIENT
Start: 2022-05-13 | End: 2023-06-16

## 2022-05-13 NOTE — PROGRESS NOTES
Subjective:       Patient ID: Emanuel Callaway is a 41 y.o. female.    Chief Complaint: Follow-up (C/o insomnia,,trazodone not effective)    Emanuel Callaway presents to the clinic today for a follow up on insomnia/ sleep issues  She was previously prescribed Doxepin and she reports that she stopped taking this because she felt that it was not helping her sleep, but it was also causing her GI issues.   She was then provided Trazodone to assist with her sleep- reports she will fall asleep, but will wake up in 2-2/5 hours and feel like she has not taken anything.   She states she is sleeping around 4-5 hours some nights, but others she will fall asleep for about 1-2 hours and be unable to fall back asleep the rest of the night and she will spend all night tossing and turning. She reports at times she will fall asleep and feel as if she slept hours only to look at the clock and see that it has only been 1-2 hours. She still co-sleeps with her son. She reports that she has been told that she snores, but reports that she was never informed of any apnea. She reports excessive daytime drowsiness and at times she will fall asleep watching tv or sitting outside.  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     Review of Systems   Constitutional: Positive for fatigue.   HENT: Negative.    Eyes: Negative.    Respiratory: Negative for chest tightness, shortness of breath and wheezing.         Snoring  Daytime drowsiness   Cardiovascular: Negative for chest pain, palpitations and leg swelling.   Gastrointestinal: Negative.    Endocrine: Negative.    Genitourinary: Negative.    Musculoskeletal: Negative.    Skin: Negative.    Allergic/Immunologic: Negative.    Neurological: Negative for dizziness, weakness and  light-headedness.   Hematological: Negative.    Psychiatric/Behavioral: Positive for sleep disturbance.       Patient Active Problem List   Diagnosis    Vaginal bleeding    , missed    Acute blood loss anemia    Acute stress disorder    BV (bacterial vaginosis)    Depressive disorder    HSV (herpes simplex virus) infection    Hypertension    Indigestion    Essential hypertension    Insomnia       Objective:      Physical Exam  Vitals and nursing note reviewed.   Constitutional:       Appearance: Normal appearance.   HENT:      Head: Normocephalic and atraumatic.      Nose: Nose normal.      Mouth/Throat:      Mouth: Mucous membranes are moist.   Eyes:      Conjunctiva/sclera: Conjunctivae normal.      Pupils: Pupils are equal, round, and reactive to light.   Cardiovascular:      Rate and Rhythm: Normal rate and regular rhythm.      Heart sounds: Normal heart sounds. No murmur heard.  Pulmonary:      Effort: Pulmonary effort is normal. No respiratory distress.      Breath sounds: Normal breath sounds.   Abdominal:      General: Bowel sounds are normal.   Musculoskeletal:      Right lower leg: No edema.      Left lower leg: No edema.   Skin:     General: Skin is warm and dry.      Capillary Refill: Capillary refill takes less than 2 seconds.   Neurological:      General: No focal deficit present.      Mental Status: She is alert.   Psychiatric:         Attention and Perception: Attention and perception normal.         Mood and Affect: Mood normal.         Lab Results   Component Value Date    WBC 4.26 2020    HGB 12.1 2020    HCT 38.4 2020     2020    CHOL 187 2020    TRIG 59 2020    HDL 65 2020    ALT 14 2020    AST 15 2020     2020    K 4.2 2020     2020    CREATININE 0.8 2020    BUN 9 2020    CO2 31 (H) 2020    TSH 0.710 2020     The 10-year ASCVD risk score (Gladstone CARLITOS Jr., et al.,  "2013) is: 0.7%    Values used to calculate the score:      Age: 41 years      Sex: Female      Is Non- : Yes      Diabetic: No      Tobacco smoker: No      Systolic Blood Pressure: 122 mmHg      Is BP treated: Yes      HDL Cholesterol: 65 mg/dL      Total Cholesterol: 187 mg/dL  Visit Vitals  /80 (BP Location: Left arm, Patient Position: Sitting, BP Method: Large (Manual))   Pulse 99   Temp 98.8 °F (37.1 °C)   Ht 5' 4" (1.626 m)   Wt 85 kg (187 lb 6.3 oz)   SpO2 100%   BMI 32.17 kg/m²      Assessment:       1. Encounter for physical examination related to employment    2. Has daytime drowsiness    3. Sleep disturbance        Plan:       Emanuel was seen today for follow-up.    Diagnoses and all orders for this visit:    Encounter for physical examination related to employment    Has daytime drowsiness  -     amitriptyline (ELAVIL) 25 MG tablet; Take 1-2 tablet by mouth at night for sleep.  Trial of amitriptyline- start with 25 mg tablet nightly 7-10 days; may increase to 2 tablets max nightly  Continue exercise regimen, decreased screen time, attempt to avoid co-sleeping.   Sleep study referral   Sleep disturbance  -     amitriptyline (ELAVIL) 25 MG tablet; Take 1-2 tablet by mouth at night for sleep.      No follow-ups on file.      Future Appointments     Date Provider Specialty Appt Notes    8/26/2022 Shelly Cordero NP Family Medicine 3 month follow up            "

## 2022-08-26 DIAGNOSIS — I10 ESSENTIAL HYPERTENSION: ICD-10-CM

## 2023-05-19 DIAGNOSIS — I10 ESSENTIAL HYPERTENSION: ICD-10-CM

## 2023-05-19 RX ORDER — AMLODIPINE BESYLATE 10 MG/1
10 TABLET ORAL DAILY
Qty: 90 TABLET | Refills: 3 | OUTPATIENT
Start: 2023-05-19

## 2023-05-19 NOTE — TELEPHONE ENCOUNTER
----- Message from Arpan Castano sent at 5/19/2023  2:02 PM CDT -----  Regarding: refill  Type:  RX Refill Request    Who Called: Pt  Refill or New Rx:refill    RX Name and Strength:amLODIPine (NORVASC) 10 MG tablet    How is the patient currently taking it? (ex. 1XDay):1x day  Is this a 30 day or 90 day RX:90    Preferred Pharmacy with phone number:  [facility], [place] - [address] [address]  Phone: [phone] Fax: [phone]      Local or Mail Order:local  Ordering Provider:Consuelo  Would the patient rather a call back or a response via [facility]? Call back  Best Call Back Number:[phone]  Additional Information: Please advise -- Thank you

## 2023-06-02 ENCOUNTER — TELEPHONE (OUTPATIENT)
Dept: FAMILY MEDICINE | Facility: CLINIC | Age: 43
End: 2023-06-02
Payer: COMMERCIAL

## 2023-06-02 NOTE — TELEPHONE ENCOUNTER
----- Message from Amber Zoraidapaulalissette sent at 6/2/2023  4:41 PM CDT -----  Regarding: advise  Contact: Patient  Type: Needs Medical Advice  Who Called:  Patient  Symptoms (please be specific):    How long has patient had these symptoms:    Pharmacy name and phone #:    Best Call Back Number: [phone]   Additional Information: I have Emanuel Callaway MRN: [id number] stating she is still waiting for her blood pressure medicine to be sent to the pharmacy from Dr. Cordero amLODIPine (NORVASC) 10 MG tablet. Are you available to assist?

## 2023-06-02 NOTE — TELEPHONE ENCOUNTER
Medication has not been prescribed since 10/27/2021. Patient would have ran out of this medication around October of 2022. Spoke with patient - patient states she has been out for a little over a month. Patient is now scheduled for Monday at 4:30pm for follow up and to discuss medication refill.

## 2023-06-12 ENCOUNTER — TELEPHONE (OUTPATIENT)
Dept: FAMILY MEDICINE | Facility: CLINIC | Age: 43
End: 2023-06-12
Payer: COMMERCIAL

## 2023-06-12 NOTE — TELEPHONE ENCOUNTER
----- Message from Amber Bocanegra sent at 6/12/2023 12:56 PM CDT -----  Regarding: same day apt  Contact: patient  Type: Needs Medical Advice  Who Called:  Patient  Symptoms (please be specific):    How long has patient had these symptoms:    Pharmacy name and phone #:    Best Call Back Number: [phone]    Additional Information: Pt wants to know if she can be seen today due to being without medication (  amLODIPine (NORVASC) 10 MG tablet) it is causing serve headaches. Please call to advise thanks!

## 2023-06-16 ENCOUNTER — LAB VISIT (OUTPATIENT)
Dept: LAB | Facility: CLINIC | Age: 43
End: 2023-06-16
Payer: COMMERCIAL

## 2023-06-16 ENCOUNTER — OFFICE VISIT (OUTPATIENT)
Dept: FAMILY MEDICINE | Facility: CLINIC | Age: 43
End: 2023-06-16
Payer: COMMERCIAL

## 2023-06-16 VITALS
BODY MASS INDEX: 31.98 KG/M2 | OXYGEN SATURATION: 98 % | WEIGHT: 186.31 LBS | DIASTOLIC BLOOD PRESSURE: 92 MMHG | HEART RATE: 76 BPM | SYSTOLIC BLOOD PRESSURE: 128 MMHG

## 2023-06-16 DIAGNOSIS — Z00.00 ENCOUNTER FOR WELLNESS EXAMINATION IN ADULT: Primary | ICD-10-CM

## 2023-06-16 DIAGNOSIS — I10 ESSENTIAL HYPERTENSION: ICD-10-CM

## 2023-06-16 DIAGNOSIS — R53.83 FATIGUE, UNSPECIFIED TYPE: ICD-10-CM

## 2023-06-16 DIAGNOSIS — D64.9 ANEMIA, UNSPECIFIED TYPE: ICD-10-CM

## 2023-06-16 DIAGNOSIS — G44.209 TENSION-TYPE HEADACHE, NOT INTRACTABLE, UNSPECIFIED CHRONICITY PATTERN: ICD-10-CM

## 2023-06-16 LAB
ALBUMIN SERPL BCP-MCNC: 3.9 G/DL (ref 3.5–5.2)
ALP SERPL-CCNC: 64 U/L (ref 55–135)
ALT SERPL W/O P-5'-P-CCNC: 11 U/L (ref 10–44)
ANION GAP SERPL CALC-SCNC: 12 MMOL/L (ref 8–16)
AST SERPL-CCNC: 9 U/L (ref 10–40)
BASOPHILS # BLD AUTO: 0.02 K/UL (ref 0–0.2)
BASOPHILS NFR BLD: 0.6 % (ref 0–1.9)
BILIRUB SERPL-MCNC: 0.7 MG/DL (ref 0.1–1)
BUN SERPL-MCNC: 10 MG/DL (ref 6–20)
CALCIUM SERPL-MCNC: 8.9 MG/DL (ref 8.7–10.5)
CHLORIDE SERPL-SCNC: 104 MMOL/L (ref 95–110)
CO2 SERPL-SCNC: 24 MMOL/L (ref 23–29)
CREAT SERPL-MCNC: 0.8 MG/DL (ref 0.5–1.4)
DIFFERENTIAL METHOD: ABNORMAL
EOSINOPHIL # BLD AUTO: 0 K/UL (ref 0–0.5)
EOSINOPHIL NFR BLD: 0.9 % (ref 0–8)
ERYTHROCYTE [DISTWIDTH] IN BLOOD BY AUTOMATED COUNT: 14.2 % (ref 11.5–14.5)
EST. GFR  (NO RACE VARIABLE): >60 ML/MIN/1.73 M^2
GLUCOSE SERPL-MCNC: 95 MG/DL (ref 70–110)
HCT VFR BLD AUTO: 38.9 % (ref 37–48.5)
HGB BLD-MCNC: 11.9 G/DL (ref 12–16)
IMM GRANULOCYTES # BLD AUTO: 0.01 K/UL (ref 0–0.04)
IMM GRANULOCYTES NFR BLD AUTO: 0.3 % (ref 0–0.5)
LYMPHOCYTES # BLD AUTO: 1.4 K/UL (ref 1–4.8)
LYMPHOCYTES NFR BLD: 43.7 % (ref 18–48)
MCH RBC QN AUTO: 26.6 PG (ref 27–31)
MCHC RBC AUTO-ENTMCNC: 30.6 G/DL (ref 32–36)
MCV RBC AUTO: 87 FL (ref 82–98)
MONOCYTES # BLD AUTO: 0.4 K/UL (ref 0.3–1)
MONOCYTES NFR BLD: 11.7 % (ref 4–15)
NEUTROPHILS # BLD AUTO: 1.4 K/UL (ref 1.8–7.7)
NEUTROPHILS NFR BLD: 42.8 % (ref 38–73)
NRBC BLD-RTO: 0 /100 WBC
PLATELET # BLD AUTO: 314 K/UL (ref 150–450)
PMV BLD AUTO: 11.3 FL (ref 9.2–12.9)
POTASSIUM SERPL-SCNC: 3.9 MMOL/L (ref 3.5–5.1)
PROT SERPL-MCNC: 7.3 G/DL (ref 6–8.4)
RBC # BLD AUTO: 4.48 M/UL (ref 4–5.4)
SODIUM SERPL-SCNC: 140 MMOL/L (ref 136–145)
TSH SERPL DL<=0.005 MIU/L-ACNC: 0.4 UIU/ML (ref 0.4–4)
WBC # BLD AUTO: 3.16 K/UL (ref 3.9–12.7)

## 2023-06-16 PROCEDURE — 1159F PR MEDICATION LIST DOCUMENTED IN MEDICAL RECORD: ICD-10-PCS | Mod: ,,, | Performed by: NURSE PRACTITIONER

## 2023-06-16 PROCEDURE — 3074F PR MOST RECENT SYSTOLIC BLOOD PRESSURE < 130 MM HG: ICD-10-PCS | Mod: ,,, | Performed by: NURSE PRACTITIONER

## 2023-06-16 PROCEDURE — 3080F PR MOST RECENT DIASTOLIC BLOOD PRESSURE >= 90 MM HG: ICD-10-PCS | Mod: ,,, | Performed by: NURSE PRACTITIONER

## 2023-06-16 PROCEDURE — 84443 ASSAY THYROID STIM HORMONE: CPT | Performed by: NURSE PRACTITIONER

## 2023-06-16 PROCEDURE — 3074F SYST BP LT 130 MM HG: CPT | Mod: ,,, | Performed by: NURSE PRACTITIONER

## 2023-06-16 PROCEDURE — 82728 ASSAY OF FERRITIN: CPT | Performed by: NURSE PRACTITIONER

## 2023-06-16 PROCEDURE — 80053 COMPREHEN METABOLIC PANEL: CPT | Performed by: NURSE PRACTITIONER

## 2023-06-16 PROCEDURE — 85025 COMPLETE CBC W/AUTO DIFF WBC: CPT | Performed by: NURSE PRACTITIONER

## 2023-06-16 PROCEDURE — 1159F MED LIST DOCD IN RCRD: CPT | Mod: ,,, | Performed by: NURSE PRACTITIONER

## 2023-06-16 PROCEDURE — 1160F RVW MEDS BY RX/DR IN RCRD: CPT | Mod: ,,, | Performed by: NURSE PRACTITIONER

## 2023-06-16 PROCEDURE — 1160F PR REVIEW ALL MEDS BY PRESCRIBER/CLIN PHARMACIST DOCUMENTED: ICD-10-PCS | Mod: ,,, | Performed by: NURSE PRACTITIONER

## 2023-06-16 PROCEDURE — 3080F DIAST BP >= 90 MM HG: CPT | Mod: ,,, | Performed by: NURSE PRACTITIONER

## 2023-06-16 PROCEDURE — 99213 PR OFFICE/OUTPT VISIT, EST, LEVL III, 20-29 MIN: ICD-10-PCS | Mod: ,,, | Performed by: NURSE PRACTITIONER

## 2023-06-16 PROCEDURE — 3008F BODY MASS INDEX DOCD: CPT | Mod: ,,, | Performed by: NURSE PRACTITIONER

## 2023-06-16 PROCEDURE — 3008F PR BODY MASS INDEX (BMI) DOCUMENTED: ICD-10-PCS | Mod: ,,, | Performed by: NURSE PRACTITIONER

## 2023-06-16 PROCEDURE — 99213 OFFICE O/P EST LOW 20 MIN: CPT | Mod: ,,, | Performed by: NURSE PRACTITIONER

## 2023-06-16 PROCEDURE — 84466 ASSAY OF TRANSFERRIN: CPT | Performed by: NURSE PRACTITIONER

## 2023-06-16 RX ORDER — AMLODIPINE BESYLATE 10 MG/1
10 TABLET ORAL DAILY
Qty: 90 TABLET | Refills: 3 | Status: SHIPPED | OUTPATIENT
Start: 2023-06-16

## 2023-06-16 RX ORDER — DICLOFENAC SODIUM 10 MG/G
4 GEL TOPICAL 4 TIMES DAILY
COMMUNITY
Start: 2023-06-13 | End: 2024-03-08

## 2023-06-16 RX ORDER — AMLODIPINE BESYLATE 10 MG/1
10 TABLET ORAL DAILY
Qty: 30 TABLET | Status: CANCELLED | OUTPATIENT
Start: 2023-06-16

## 2023-06-16 NOTE — PROGRESS NOTES
Subjective:       Patient ID: Emanuel Callaway is a 43 y.o. female.    Chief Complaint: Hypertension (Patient reports she monitors BP at home on as needed basis only. Patient reports she has been without BP medication for approximately 2 months. Patient would also like to discuss if strength can be lowered from the current 10mg down to 5mg. ) and Medication Refill    Emanuel Callaway presents to the clinic today for medication refills   Followed by gynecology: Ke Hicks- last seen 4/10/2023  Patient Active Problem List  Diagnosis  · Vaginal bleeding  · , missed  · Acute blood loss anemia  · Acute stress disorder  · BV (bacterial vaginosis)  · Depressive disorder  · HSV (herpes simplex virus) infection  · Hypertension  · Indigestion  · Essential hypertension    Hypertension:  Patient reports she monitors BP at home on as needed basis only. Patient reports she has been without BP medication for approximately 2 months. Patient would also like to discuss if strength can be lowered from the current 10mg down to 5mg.   Reports she feels the higher dose is causing thinning of her hair    Headaches:  Reports she has been experiencing intermittent headaches. Recently had a vision screen- obtained new glasses, but has not been wearing them.     Review of Systems   Constitutional: Negative.    HENT: Negative.     Eyes:  Negative for pain and visual disturbance.   Respiratory:  Negative for chest tightness, shortness of breath and wheezing.    Cardiovascular:  Negative for chest pain and palpitations.   Gastrointestinal:  Negative for abdominal pain, constipation and diarrhea.   Endocrine: Negative.    Genitourinary: Negative.    Musculoskeletal: Negative.    Skin: Negative.    Allergic/Immunologic: Negative.    Neurological:  Positive for headaches. Negative for dizziness and weakness.   Hematological: Negative.    Psychiatric/Behavioral: Negative.       Patient Active Problem List   Diagnosis    Vaginal bleeding     , missed    Acute blood loss anemia    Acute stress disorder    BV (bacterial vaginosis)    Depressive disorder    HSV (herpes simplex virus) infection    Hypertension    Indigestion    Essential hypertension    Insomnia       Objective:      Physical Exam  Vitals and nursing note reviewed.   Constitutional:       General: She is not in acute distress.     Appearance: Normal appearance.   Eyes:      Extraocular Movements: Extraocular movements intact.      Conjunctiva/sclera: Conjunctivae normal.   Cardiovascular:      Rate and Rhythm: Normal rate and regular rhythm.      Heart sounds: Normal heart sounds. No murmur heard.  Pulmonary:      Effort: Pulmonary effort is normal. No respiratory distress.      Breath sounds: Normal breath sounds.   Musculoskeletal:      Right lower leg: No edema.      Left lower leg: No edema.   Skin:     General: Skin is warm and dry.      Capillary Refill: Capillary refill takes less than 2 seconds.   Neurological:      General: No focal deficit present.      Mental Status: She is alert.   Psychiatric:         Attention and Perception: Attention and perception normal.         Mood and Affect: Mood normal.       Lab Results   Component Value Date    WBC 4.26 2020    HGB 12.1 2020    HCT 38.4 2020     2020    CHOL 187 2020    TRIG 59 2020    HDL 65 2020    ALT 14 2020    AST 15 2020     2020    K 4.2 2020     2020    CREATININE 0.8 2020    BUN 9 2020    CO2 31 (H) 2020    TSH 0.710 2020     The 10-year ASCVD risk score (Damaso OVALLE, et al., 2019) is: 1.2%    Values used to calculate the score:      Age: 43 years      Sex: Female      Is Non- : Yes      Diabetic: No      Tobacco smoker: No      Systolic Blood Pressure: 128 mmHg      Is BP treated: Yes      HDL Cholesterol: 65 mg/dL      Total Cholesterol: 187 mg/dL  Visit Vitals  BP (!) 128/92  (BP Location: Left arm, Patient Position: Sitting, BP Method: Medium (Manual))   Pulse 76   Wt 84.5 kg (186 lb 4.6 oz)   LMP 06/14/2023 (Exact Date)   SpO2 98%   BMI 31.98 kg/m²      Assessment:       1. Encounter for wellness examination in adult    2. Essential hypertension    3. Fatigue, unspecified type    4. Tension-type headache, not intractable, unspecified chronicity pattern        Plan:       1. Encounter for wellness examination in adult    2. Essential hypertension  -     TSH; Future; Expected date: 06/16/2023  -     amLODIPine (NORVASC) 10 MG tablet; Take 1 tablet (10 mg total) by mouth once daily.  Dispense: 90 tablet; Refill: 3  The patient was counseled on HTN education, management and recommendations. The need for weight reduction was reinforced and a BMI goal of 19 to 25 was set.  Patient was encouraged to adhere to a low sodium diet and a DASH diet was recommended. Patient was also encouraged to engage in routine exercise such as walking most days of the week greater than 30 minutes. Patient education materials were provided to the patient for home review and further reinforcement of topics discussed.     Please monitor your blood pressure twice a day.  Make sure you have not had any caffeine or tobacco with in 45 minutes of checking your blood pressure.      3. Fatigue, unspecified type  -     CBC Auto Differential; Future; Expected date: 06/16/2023    Wear glasses as prescribed- straining of the eyes can cause headaches- if routinely wearing corrective lenses and headaches persistent/ change  rtc for further evaluation      Follow up in about 1 year (around 6/16/2024).      Future Appointments       Date Provider Specialty Appt Notes    6/16/2023 Shelly Cordero NP Family Medicine *coming in early* medication refill

## 2023-06-19 DIAGNOSIS — D64.9 ANEMIA, UNSPECIFIED TYPE: Primary | ICD-10-CM

## 2023-06-19 LAB — FERRITIN SERPL-MCNC: 15 NG/ML (ref 20–300)

## 2023-06-20 LAB
IRON SERPL-MCNC: 65 UG/DL (ref 30–160)
SATURATED IRON: 15 % (ref 20–50)
TOTAL IRON BINDING CAPACITY: 420 UG/DL (ref 250–450)
TRANSFERRIN SERPL-MCNC: 284 MG/DL (ref 200–375)

## 2023-06-26 ENCOUNTER — TELEPHONE (OUTPATIENT)
Dept: FAMILY MEDICINE | Facility: CLINIC | Age: 43
End: 2023-06-26
Payer: COMMERCIAL

## 2023-06-26 NOTE — LETTER
June 26, 2023    Emanuel Callaway  [address]  [place] [address]             [place] [address] [facility] [address]  [place] [address]  Phone: [phone]  Fax: [phone] Dear Ms. Callaway:    This letter is to inform you that your Thyroid level is normal  Kidney and liver function are normal  Electrolytes are normal   Slight anemia- add daily multi vitamin with iron   Repeat blood work again in 1 year .      Sincerely,        Shelly Cordero NP

## 2023-12-04 ENCOUNTER — OFFICE VISIT (OUTPATIENT)
Dept: URGENT CARE | Facility: CLINIC | Age: 43
End: 2023-12-04
Payer: COMMERCIAL

## 2023-12-04 VITALS
OXYGEN SATURATION: 100 % | BODY MASS INDEX: 31.76 KG/M2 | HEART RATE: 92 BPM | HEIGHT: 64 IN | DIASTOLIC BLOOD PRESSURE: 87 MMHG | TEMPERATURE: 98 F | SYSTOLIC BLOOD PRESSURE: 139 MMHG | WEIGHT: 186 LBS

## 2023-12-04 DIAGNOSIS — S99.921A TOE INJURY, RIGHT, INITIAL ENCOUNTER: Primary | ICD-10-CM

## 2023-12-04 PROCEDURE — 99204 OFFICE O/P NEW MOD 45 MIN: CPT | Mod: S$GLB,,, | Performed by: NURSE PRACTITIONER

## 2023-12-04 PROCEDURE — 99204 PR OFFICE/OUTPT VISIT, NEW, LEVL IV, 45-59 MIN: ICD-10-PCS | Mod: S$GLB,,, | Performed by: NURSE PRACTITIONER

## 2023-12-04 NOTE — PROGRESS NOTES
"Subjective:      Patient ID: Emanuel Callaway is a 43 y.o. female.    Vitals:  height is 5' 4" (1.626 m) and weight is 84.4 kg (186 lb). Her oral temperature is 97.9 °F (36.6 °C). Her blood pressure is 139/87 and her pulse is 92. Her oxygen saturation is 100%.     Chief Complaint: Toe Injury (Right Toe)    Toe Injury  This is a new problem. The current episode started yesterday. The problem has been unchanged. Associated symptoms include numbness.     Neurological:  Positive for numbness.    Objective:     Physical Exam    Assessment:     No diagnosis found.    Plan:       There are no diagnoses linked to this encounter.                "

## 2023-12-04 NOTE — PROGRESS NOTES
CHIEF COMPLAINT  Chief Complaint   Patient presents with    Toe Injury     Right Toe       HPI  Emanuel Tucker a 43 y.o. female who presents with complaints of right toe pain and numbness.  Patient reports she is in the process of moving and accidentally kicked a bag last night and has increased swelling redness and tenderness this morning to right great toe.  Patient reports she took a BC powder last night for the pain.  Patient not taken any over-the-counter medications today.  Patient ambulatory in clinic with a limp.      CURRENT MEDICATIONS  Current Outpatient Medications on File Prior to Visit   Medication Sig Dispense Refill    amLODIPine (NORVASC) 10 MG tablet Take 1 tablet (10 mg total) by mouth once daily. 90 tablet 3    diclofenac sodium (VOLTAREN) 1 % Gel Apply 4 g topically 4 (four) times daily.       No current facility-administered medications on file prior to visit.       ALLERGIES  Review of patient's allergies indicates:  No Known Allergies    Immunization History   Administered Date(s) Administered    Tdap 2018       PAST MEDICAL HISTORY  Past Medical History:   Diagnosis Date    Essential hypertension        SURGICAL HISTORY  Past Surgical History:   Procedure Laterality Date     SECTION         SOCIAL HISTORY  Social History     Socioeconomic History    Marital status: Single   Occupational History    Occupation: [employer]   Tobacco Use    Smoking status: Never    Smokeless tobacco: Never   Substance and Sexual Activity    Alcohol use: Yes     Alcohol/week: 1.0 standard drink of alcohol     Types: 1 Glasses of wine per week     Comment: socially       FAMILY HISTORY  Family History   Problem Relation Age of Onset    Hypertension Mother     Hypertension Father        REVIEW OF SYSTEMS  Constitutional: No fever, chills, or weakness.  Respiratory: No cough, wheezing or shortness of breath  Cardiovascular: No chest pain, palpitations or edema  Musculoskeletal:  Right great  toe pain, full range of motion. Good sensation  Skin:  Swelling and redness to right great toe  Neurologic: No focal weakness or sensory changes.  All systems otherwise negative except as noted in the Review of Systems and History of Present Illness      PHYSICAL EXAM  Reviewed Triage Note  VITAL SIGNS:  139/87  Constitutional: Well developed, well nourished, Alert and oriented x3, No acute distress, non-toxic appearance.  Neck: Normal range of motion, no tenderness, supple, no carotid bruits  Respiratory: Normal breath sounds, no respiratory distress, no wheezing, no rhonchi, no rales  Cardiovascular:  HR 92, normal rhythm, no murmurs, no rubs, no gallops.  Musculoskeletal:  Edema and tenderness to right great toe, no cyanosis, no clubbing.  Limited range of motion to right great toe.  No major deformities noted.   Integument: Warm, Dry, erythema to right great toe  Neurologic: Normal motor function, normal sensory function. No focal deficits noted. Intact distal pulses  Psychiatric: Affect normal, judgment normal, mood normal          RADIOLOGY  No orders to display     Script for outpatient x-ray provided with instructions to go to [place]        MEDICAL DECISION MAKING    Physical exam findings discussed with patient.  Discussed the use of over-the-counter medication and ice application with elevation to help with pain and swelling.  Script for outpatient x-ray provided with instructions. Pt agrees with plan of care.     DISPOSITION  Patient discharged in stable condition       CLINICAL IMPRESSION:  The encounter diagnosis was Toe injury, right, initial encounter.    Patient advised to follow-up with your PCP within 3 days for BP re-check if Blood Pressure was >120/80 without history of hypertension.

## 2024-03-08 ENCOUNTER — HOSPITAL ENCOUNTER (OUTPATIENT)
Dept: PREADMISSION TESTING | Facility: HOSPITAL | Age: 44
Discharge: HOME | End: 2024-03-08
Attending: INTERNAL MEDICINE
Payer: COMMERCIAL

## 2024-03-08 VITALS
WEIGHT: 180 LBS | BODY MASS INDEX: 30.73 KG/M2 | TEMPERATURE: 98 F | HEART RATE: 86 BPM | RESPIRATION RATE: 16 BRPM | HEIGHT: 64 IN | SYSTOLIC BLOOD PRESSURE: 133 MMHG | DIASTOLIC BLOOD PRESSURE: 85 MMHG | OXYGEN SATURATION: 99 %

## 2024-03-08 DIAGNOSIS — Z01.818 PREOP TESTING: Primary | ICD-10-CM

## 2024-03-08 DIAGNOSIS — Z01.818 PREOP TESTING: ICD-10-CM

## 2024-03-08 LAB
ANION GAP SERPL CALC-SCNC: 6 MMOL/L (ref 8–16)
BASOPHILS # BLD AUTO: 0.04 K/UL (ref 0–0.2)
BASOPHILS NFR BLD: 0.8 % (ref 0–1.9)
BUN SERPL-MCNC: 12 MG/DL (ref 6–20)
CALCIUM SERPL-MCNC: 9.5 MG/DL (ref 8.7–10.5)
CHLORIDE SERPL-SCNC: 104 MMOL/L (ref 95–110)
CO2 SERPL-SCNC: 27 MMOL/L (ref 23–29)
CREAT SERPL-MCNC: 0.8 MG/DL (ref 0.5–1.4)
DIFFERENTIAL METHOD BLD: ABNORMAL
EOSINOPHIL # BLD AUTO: 0 K/UL (ref 0–0.5)
EOSINOPHIL NFR BLD: 0.8 % (ref 0–8)
ERYTHROCYTE [DISTWIDTH] IN BLOOD BY AUTOMATED COUNT: 15.2 % (ref 11.5–14.5)
EST. GFR  (NO RACE VARIABLE): >60 ML/MIN/1.73 M^2
GLUCOSE SERPL-MCNC: 88 MG/DL (ref 70–110)
HCT VFR BLD AUTO: 36.2 % (ref 37–48.5)
HGB BLD-MCNC: 11.4 G/DL (ref 12–16)
IMM GRANULOCYTES # BLD AUTO: 0.01 K/UL (ref 0–0.04)
IMM GRANULOCYTES NFR BLD AUTO: 0.2 % (ref 0–0.5)
LYMPHOCYTES # BLD AUTO: 1.5 K/UL (ref 1–4.8)
LYMPHOCYTES NFR BLD: 31.8 % (ref 18–48)
MCH RBC QN AUTO: 26.6 PG (ref 27–31)
MCHC RBC AUTO-ENTMCNC: 31.5 G/DL (ref 32–36)
MCV RBC AUTO: 84 FL (ref 82–98)
MONOCYTES # BLD AUTO: 0.7 K/UL (ref 0.3–1)
MONOCYTES NFR BLD: 13.6 % (ref 4–15)
NEUTROPHILS # BLD AUTO: 2.5 K/UL (ref 1.8–7.7)
NEUTROPHILS NFR BLD: 52.8 % (ref 38–73)
NRBC BLD-RTO: 0 /100 WBC
PLATELET # BLD AUTO: 307 K/UL (ref 150–450)
PMV BLD AUTO: 10.1 FL (ref 9.2–12.9)
POTASSIUM SERPL-SCNC: 4.5 MMOL/L (ref 3.5–5.1)
RBC # BLD AUTO: 4.29 M/UL (ref 4–5.4)
SODIUM SERPL-SCNC: 137 MMOL/L (ref 136–145)
WBC # BLD AUTO: 4.78 K/UL (ref 3.9–12.7)

## 2024-03-08 PROCEDURE — 36415 COLL VENOUS BLD VENIPUNCTURE: CPT | Performed by: ANESTHESIOLOGY

## 2024-03-08 PROCEDURE — 93005 ELECTROCARDIOGRAM TRACING: CPT | Performed by: INTERNAL MEDICINE

## 2024-03-08 PROCEDURE — 85025 COMPLETE CBC W/AUTO DIFF WBC: CPT | Performed by: ANESTHESIOLOGY

## 2024-03-08 PROCEDURE — 80048 BASIC METABOLIC PNL TOTAL CA: CPT | Performed by: ANESTHESIOLOGY

## 2024-03-08 PROCEDURE — 93010 ELECTROCARDIOGRAM REPORT: CPT | Mod: ,,, | Performed by: INTERNAL MEDICINE

## 2024-03-08 NOTE — DISCHARGE INSTRUCTIONS
INSTRUCTIONS  To confirm your doctor has scheduled your surgery for:3/12/24    Morning of surgery please check in with registration near [facility] Entrance then proceed to Registration then to endoscopy department    ENDOSCOPY NURSES will call the afternoon prior to procedure with your final arrival time.    TAKE ONLY THESE MEDICATIONS WITH A SMALL SIP OF WATER THE MORNING OF SURGERY:    DO NOT TAKE ANY INSULIN OR ORAL DIABETIC MEDICATIONS THE MORNING OF SURGERY UNLESS DIRECTED BY YOUR PHYSICIAN OR PRE ADMIT NURSE.    DO NOT TAKE THESE MEDICATIONS 5-7 DAYS PRIOR to your procedure per your surgeon's request: ASPIRIN, ALEVE, BC powder, AMAURY SELTZER, IBUPROFEN, FISH OIL, VITAMIN E, OR HERBALS   (May take Tylenol)    If you are prescribed any types of blood thinners (Aspirin, Coumadin, Plavix, Pradaxa, Xarelto, Aggrenox, Effient, Eliquis, Savasya, Brilinta or any other), please ask your surgeon how many days before scheduled procedure should you stop taking them. You may also need to verify with prescribing physician if it is OK to stop your blood thinners.      INSTRUCTIONS IMPORTANT!!  Do not eat or drink anything between midnight and the time of your procedure- this includes gum, mints, and candy. You may brush your teeth but do not swallow.  ONLY if you are diabetic, check your sugar in the morning before your procedure.  Do not smoke, vape or drink alcoholic beverages 24 hours prior to your procedure.  If you wear contact lenses, dentures, hearing aids or glasses, bring a container to put them in during surgery and give to a family member for safe keeping.    Please leave all jewelry, piercing's and valuables at home.   Wear comfortable loose clothing and rubber soled shoes.  If your condition changes such as fever, cough, etc, please notify your surgeon.   ONLY if you have been diagnosed with sleep apnea please bring your C-PAP machine.  ONLY if you wear home oxygen please bring your portable oxygen tank the  day of your procedure.   ONLY for patients requiring bowel prep, written instructions will be given by your doctor's office.  ONLY if you have a neuro stimulator, please bring the controller with you the morning of surgery  Make arrangements in advance for transportation home by a responsible adult.  You must make arrangements for transportation, TAXI'S, UBER'S OR LYFTS ARE NOT ALLOWED.        If you have any questions about these instructions, call Endoscopy Nurse at [phone]

## 2024-03-09 LAB
OHS QRS DURATION: 66 MS
OHS QTC CALCULATION: 401 MS

## 2024-03-12 ENCOUNTER — HOSPITAL ENCOUNTER (OUTPATIENT)
Facility: HOSPITAL | Age: 44
Discharge: HOME | End: 2024-03-12
Attending: INTERNAL MEDICINE | Admitting: INTERNAL MEDICINE
Payer: COMMERCIAL

## 2024-03-12 ENCOUNTER — ANESTHESIA (OUTPATIENT)
Dept: SURGERY | Facility: HOSPITAL | Age: 44
End: 2024-03-12
Payer: COMMERCIAL

## 2024-03-12 ENCOUNTER — ANESTHESIA EVENT (OUTPATIENT)
Dept: SURGERY | Facility: HOSPITAL | Age: 44
End: 2024-03-12
Payer: COMMERCIAL

## 2024-03-12 VITALS
SYSTOLIC BLOOD PRESSURE: 110 MMHG | RESPIRATION RATE: 16 BRPM | DIASTOLIC BLOOD PRESSURE: 63 MMHG | HEART RATE: 90 BPM | OXYGEN SATURATION: 100 %

## 2024-03-12 VITALS
SYSTOLIC BLOOD PRESSURE: 139 MMHG | HEART RATE: 68 BPM | TEMPERATURE: 99 F | OXYGEN SATURATION: 100 % | DIASTOLIC BLOOD PRESSURE: 86 MMHG | RESPIRATION RATE: 16 BRPM

## 2024-03-12 DIAGNOSIS — R19.4 CHANGE IN BOWEL HABITS: ICD-10-CM

## 2024-03-12 LAB
B-HCG UR QL: NEGATIVE
CTP QC/QA: YES

## 2024-03-12 PROCEDURE — 37000008 HC ANESTHESIA 1ST 15 MINUTES: Performed by: INTERNAL MEDICINE

## 2024-03-12 PROCEDURE — D9220A PRA ANESTHESIA: Mod: CRNA,,, | Performed by: STUDENT IN AN ORGANIZED HEALTH CARE EDUCATION/TRAINING PROGRAM

## 2024-03-12 PROCEDURE — 27201089 HC SNARE, DISP (ANY): Performed by: INTERNAL MEDICINE

## 2024-03-12 PROCEDURE — 45385 COLONOSCOPY W/LESION REMOVAL: CPT | Performed by: INTERNAL MEDICINE

## 2024-03-12 PROCEDURE — 43239 EGD BIOPSY SINGLE/MULTIPLE: CPT | Mod: 59 | Performed by: INTERNAL MEDICINE

## 2024-03-12 PROCEDURE — D9220A PRA ANESTHESIA: Mod: ANES,,, | Performed by: ANESTHESIOLOGY

## 2024-03-12 PROCEDURE — 37000009 HC ANESTHESIA EA ADD 15 MINS: Performed by: INTERNAL MEDICINE

## 2024-03-12 PROCEDURE — 63600175 PHARM REV CODE 636 W HCPCS: Performed by: STUDENT IN AN ORGANIZED HEALTH CARE EDUCATION/TRAINING PROGRAM

## 2024-03-12 PROCEDURE — 43248 EGD GUIDE WIRE INSERTION: CPT | Performed by: INTERNAL MEDICINE

## 2024-03-12 PROCEDURE — C1769 GUIDE WIRE: HCPCS | Performed by: INTERNAL MEDICINE

## 2024-03-12 PROCEDURE — 25000003 PHARM REV CODE 250: Performed by: INTERNAL MEDICINE

## 2024-03-12 PROCEDURE — 27200043 HC FORCEPS, BIOPSY: Performed by: INTERNAL MEDICINE

## 2024-03-12 PROCEDURE — 25000003 PHARM REV CODE 250: Performed by: STUDENT IN AN ORGANIZED HEALTH CARE EDUCATION/TRAINING PROGRAM

## 2024-03-12 PROCEDURE — 81025 URINE PREGNANCY TEST: CPT | Performed by: INTERNAL MEDICINE

## 2024-03-12 RX ORDER — PROPOFOL 10 MG/ML
VIAL (ML) INTRAVENOUS
Status: DISCONTINUED | OUTPATIENT
Start: 2024-03-12 | End: 2024-03-12

## 2024-03-12 RX ORDER — DEXTROMETHORPHAN/PSEUDOEPHED 2.5-7.5/.8
DROPS ORAL
Status: COMPLETED | OUTPATIENT
Start: 2024-03-12 | End: 2024-03-12

## 2024-03-12 RX ADMIN — PROPOFOL 30 MG: 10 INJECTION, EMULSION INTRAVENOUS at 09:03

## 2024-03-12 RX ADMIN — PROPOFOL 50 MG: 10 INJECTION, EMULSION INTRAVENOUS at 09:03

## 2024-03-12 RX ADMIN — PROPOFOL 100 MG: 10 INJECTION, EMULSION INTRAVENOUS at 09:03

## 2024-03-12 RX ADMIN — SODIUM CHLORIDE: 900 INJECTION INTRAVENOUS at 08:03

## 2024-03-12 RX ADMIN — PROPOFOL 40 MG: 10 INJECTION, EMULSION INTRAVENOUS at 09:03

## 2024-03-12 NOTE — H&P
GASTROENTEROLOGY PRE-PROCEDURE H&P NOTE  Patient Name: Emanuel Callaway  Patient MRN: [id number]  Patient : 1980    Service date: 3/12/2024    PCP: Shelly Cordero NP    No chief complaint on file.      HPI: Patient is a 43 y.o. female with PMHx as below here for evaluation of dysphagia, nausea and change in BM.     Past Medical History:  Past Medical History:   Diagnosis Date    CHF (congestive heart failure) 2009    Essential hypertension         Past Surgical History:  Past Surgical History:   Procedure Laterality Date     SECTION          Home Medications:  Medications Prior to Admission   Medication Sig Dispense Refill Last Dose    amLODIPine (NORVASC) 10 MG tablet Take 1 tablet (10 mg total) by mouth once daily. 90 tablet 3                Review of patient's allergies indicates:  No Known Allergies    Social History:   Social History     Occupational History    Occupation: early [employer]   Tobacco Use    Smoking status: Never    Smokeless tobacco: Never   Substance and Sexual Activity    Alcohol use: Yes     Alcohol/week: 1.0 standard drink of alcohol     Types: 1 Glasses of wine per week     Comment: socially    Drug use: Not on file    Sexual activity: Not on file       Family History:   Family History   Problem Relation Age of Onset    Hypertension Mother     Hypertension Father        Review of Systems:  A 10 point review of systems was performed and was normal, except as mentioned in the HPI, including constitutional, HEENT, heme, lymph, cardiovascular, respiratory, gastrointestinal, genitourinary, neurologic, endocrine, psychiatric and musculoskeletal.      OBJECTIVE:    Physical Exam:  24 Hour Vital Sign Ranges: Temp:  [98.8 °F (37.1 °C)] 98.8 °F (37.1 °C)  Pulse:  [90] 90  Resp:  [16] 16  SpO2:  [100 %] 100 %  BP: (130)/(64) 130/64  Most recent vitals: /64   Pulse 90   Temp 98.8 °F (37.1 °C) (Oral)   Resp 16   LMP 2024 (Approximate)   SpO2 100%    GEN:  "well-developed, well-nourished, awake and alert, non-toxic appearing adult  HEENT: PERRL, sclera anicteric, oral mucosa pink and moist without lesion  NECK: trachea midline; Good ROM  CV: regular rate and rhythm, no murmurs or gallops  RESP: clear to auscultation bilaterally, no wheezes, rhonci or rales  ABD: soft, non-tender, non-distended, normal bowel sounds  EXT: no swelling or edema, 2+ pulses distally  SKIN: no rashes or jaundice  PSYCH: normal affect    Labs:   No results for input(s): "WBC", "MCV", "PLT" in the last 72 hours.    Invalid input(s): "HGBAU"  No results for input(s): "NA", "K", "CL", "CO2", "BUN", "GLU" in the last 72 hours.    Invalid input(s): "CREA"  No results for input(s): "ALB" in the last 72 hours.    Invalid input(s): "ALKP", "SGOT", "SGPT", "TBIL", "DBIL", "TPRO"  No results for input(s): "PT", "INR", "PTT" in the last 72 hours.      IMPRESSION / RECOMMENDATIONS:  EGD/Colon  with interventions as warranted.   RIsks, benefits, alternatives discussed in detail regarding upcoming procedures and sedation. Some of the more common endoscopic complications include but not limited to immediate or delayed perforation, bleeding, infections, pain, inadvertent injury to surrounding tissue / organs and possible need for surgical evaluation. Patient expressed understanding, all questions answered and will proceed with procedure as planned.     Magdiel Arnold III  3/12/2024  9:15 AM      "

## 2024-03-12 NOTE — TRANSFER OF CARE
Anesthesia Transfer of Care Note    Patient: Emanuel Callaway    Procedure(s) Performed: Procedure(s) (LRB):  COLONOSCOPY (N/A)  EGD (ESOPHAGOGASTRODUODENOSCOPY) (N/A)    Patient location: GI    Anesthesia Type: general    Transport from OR: Transported from OR on room air with adequate spontaneous ventilation    Post pain: adequate analgesia    Post assessment: no apparent anesthetic complications and tolerated procedure well    Post vital signs: stable    Level of consciousness: awake    Nausea/Vomiting: no nausea/vomiting    Complications: none    Transfer of care protocol was followed      Last vitals: Visit Vitals  /64   Pulse 90   Temp 37.1 °C (98.8 °F) (Oral)   Resp 16   LMP 03/01/2024 (Approximate)   SpO2 100%

## 2024-03-12 NOTE — ANESTHESIA PREPROCEDURE EVALUATION
03/12/2024  Emanuel Callaway is a 43 y.o., female.      Pre-op Assessment    I have reviewed the Patient Summary Reports.     I have reviewed the Nursing Notes. I have reviewed the NPO Status.   I have reviewed the Medications.     Review of Systems  Anesthesia Hx:             Denies Family Hx of Anesthesia complications.    Denies Personal Hx of Anesthesia complications.                    Hematology/Oncology:       -- Anemia:                                  Cardiovascular:     Hypertension       CHF                                 Psych:    depression                 Anesthesia Plan  Type of Anesthesia, risks & benefits discussed:    Anesthesia Type: Gen Natural Airway  Intra-op Monitoring Plan: Standard ASA Monitors  Induction:  IV  Informed Consent: Informed consent signed with the Patient and all parties understand the risks and agree with anesthesia plan.  All questions answered.   ASA Score: 3    Ready For Surgery From Anesthesia Perspective.     .

## 2024-03-12 NOTE — ANESTHESIA POSTPROCEDURE EVALUATION
Anesthesia Post Evaluation    Patient: Emanuel Calalway    Procedure(s) Performed: Procedure(s) (LRB):  COLONOSCOPY (N/A)  EGD (ESOPHAGOGASTRODUODENOSCOPY) (N/A)    Final Anesthesia Type: general      Patient location during evaluation: GI PACU  Patient participation: Yes- Able to Participate  Level of consciousness: awake and alert  Post-procedure vital signs: reviewed and stable  Pain management: adequate  Airway patency: patent    PONV status at discharge: No PONV  Anesthetic complications: no      Cardiovascular status: stable  Respiratory status: unassisted  Hydration status: euvolemic  Follow-up not needed.              Vitals Value Taken Time   /86 03/12/24 1024   Temp 37 °C (98.6 °F) 03/12/24 1006   Pulse 72 03/12/24 1025   Resp 16 03/12/24 1024   SpO2 100 % 03/12/24 1025   Vitals shown include unvalidated device data.      Event Time   Out of Recovery 10:28:22         Pain/Jen Score: Jen Score: 10 (3/12/2024 10:05 AM)

## 2024-03-12 NOTE — [employer] PATIENT INSTRUCTIONS
Discharge Summary/Instructions after an Endoscopic Procedure  Patient Name: Emanuel Callaway  Patient MRN: [id number]  Patient YOB: 1980  Tuesday, March 12, 2024  Magdiel Arnold III, MD  RESTRICTIONS:  During your procedure today, you received medications for sedation.  These   medications may affect your judgment, balance and coordination.  Therefore,   for 24 hours, you have the following restrictions:   - DO NOT drive a car, operate machinery, make legal/financial decisions,   sign important papers or drink alcohol.    ACTIVITY:  Today: no heavy lifting, straining or running due to procedural   sedation/anesthesia.  The following day: return to full activity including work.  DIET:  Eat and drink normally unless instructed otherwise.     TREATMENT FOR COMMON SIDE EFFECTS:  - Mild abdominal pain, nausea, belching, bloating or excessive gas:  rest,   eat lightly and use a heating pad.  - Sore Throat: treat with throat lozenges and/or gargle with warm salt   water.  - Because air was used during the procedure, expelling large amounts of air   from your rectum or belching is normal.  - If a bowel prep was taken, you may not have a bowel movement for 1-3 days.    This is normal.  SYMPTOMS TO WATCH FOR AND REPORT TO YOUR PHYSICIAN:  1. Abdominal pain or bloating, other than gas cramps.  2. Chest pain.  3. Back pain.  4. Signs of infection such as: chills or fever occurring within 24 hours   after the procedure.  5. Rectal bleeding, which would show as bright red, maroon, or black stools.   (A tablespoon of blood from the rectum is not serious, especially if   hemorrhoids are present.)  6. Vomiting.  7. Weakness or dizziness.  GO DIRECTLY TO THE NEAREST EMERGENCY ROOM IF YOU HAVE ANY OF THE FOLLOWING:      Difficulty breathing              Chills and/or fever over 101 F   Persistent vomiting and/or vomiting blood   Severe abdominal pain   Severe chest pain   Black, tarry stools   Bleeding- more than one  tablespoon   Any other symptom or condition that you feel may need urgent attention  Your doctor recommends these additional instructions:  If any biopsies were taken, your doctors clinic will contact you in 1 to 2   weeks with any results.  - Patient has a contact number available for emergencies.  The signs and   symptoms of potential delayed complications were discussed with the   patient.  Return to normal activities tomorrow.  Written discharge   instructions were provided to the patient.   - Discharge patient to home (with escort).   - Await pathology results.  For questions, problems or results please call your physician - Magdiel Arnold III, MD at Work:  [phone].  [facility], EMERGENCY ROOM PHONE NUMBER: [phone]  IF A COMPLICATION OR EMERGENCY SITUATION ARISES AND YOU ARE UNABLE TO REACH   YOUR PHYSICIAN - GO DIRECTLY TO THE EMERGENCY ROOM.  Magdiel Arnold III, MD  3/12/2024 9:55:32 AM  This report has been verified and signed electronically.  Dear patient,  As a result of recent federal legislation (The Federal Cures Act), you may   receive lab or pathology results from your procedure in your [facility]   account before your physician is able to contact you. Your physician or   their representative will relay the results to you with their   recommendations at their soonest availability.  Thank you,  [employer]

## 2024-07-10 ENCOUNTER — PATIENT OUTREACH (OUTPATIENT)
Dept: ADMINISTRATIVE | Facility: HOSPITAL | Age: 44
End: 2024-07-10
Payer: COMMERCIAL

## 2024-07-10 NOTE — PROGRESS NOTES
Population Health Chart Review & Patient Outreach Details      Additional [facility] Health Notes:               Updates Requested / Reviewed:      Updated Care Coordination Note, Care Everywhere, and Immunizations Reconciliation Completed or Queried: [place]         Health Maintenance Topics Overdue:      [facility] Score: 1     Mammogram                       Health Maintenance Topic(s) Outreach Outcomes & Actions Taken:    Primary Care Appt - Outreach Outcomes & Actions Taken  : Patient Declined Scheduling or Will Call Back to Schedule

## 2024-08-06 ENCOUNTER — OFFICE VISIT (OUTPATIENT)
Dept: FAMILY MEDICINE | Facility: CLINIC | Age: 44
End: 2024-08-06
Payer: COMMERCIAL

## 2024-08-06 VITALS
HEART RATE: 88 BPM | SYSTOLIC BLOOD PRESSURE: 122 MMHG | WEIGHT: 176 LBS | RESPIRATION RATE: 16 BRPM | BODY MASS INDEX: 30.05 KG/M2 | DIASTOLIC BLOOD PRESSURE: 80 MMHG | HEIGHT: 64 IN | OXYGEN SATURATION: 99 %

## 2024-08-06 DIAGNOSIS — Z11.59 ENCOUNTER FOR HEPATITIS C SCREENING TEST FOR LOW RISK PATIENT: ICD-10-CM

## 2024-08-06 DIAGNOSIS — D64.9 ANEMIA, UNSPECIFIED TYPE: ICD-10-CM

## 2024-08-06 DIAGNOSIS — I10 ESSENTIAL HYPERTENSION: ICD-10-CM

## 2024-08-06 DIAGNOSIS — Z11.4 SCREENING FOR HIV WITHOUT PRESENCE OF RISK FACTORS: ICD-10-CM

## 2024-08-06 DIAGNOSIS — Z00.00 ENCOUNTER FOR WELLNESS EXAMINATION IN ADULT: Primary | ICD-10-CM

## 2024-08-06 DIAGNOSIS — Z13.220 ENCOUNTER FOR SCREENING FOR LIPID DISORDER: ICD-10-CM

## 2024-08-06 PROCEDURE — 99999 PR PBB SHADOW E&M-EST. PATIENT-LVL III: CPT | Mod: PBBFAC,,, | Performed by: NURSE PRACTITIONER

## 2024-08-06 PROCEDURE — 1160F RVW MEDS BY RX/DR IN RCRD: CPT | Mod: S$GLB,,, | Performed by: NURSE PRACTITIONER

## 2024-08-06 PROCEDURE — 1159F MED LIST DOCD IN RCRD: CPT | Mod: S$GLB,,, | Performed by: NURSE PRACTITIONER

## 2024-08-06 PROCEDURE — 3074F SYST BP LT 130 MM HG: CPT | Mod: S$GLB,,, | Performed by: NURSE PRACTITIONER

## 2024-08-06 PROCEDURE — 99214 OFFICE O/P EST MOD 30 MIN: CPT | Mod: S$GLB,,, | Performed by: NURSE PRACTITIONER

## 2024-08-06 PROCEDURE — 3079F DIAST BP 80-89 MM HG: CPT | Mod: S$GLB,,, | Performed by: NURSE PRACTITIONER

## 2024-08-06 PROCEDURE — 3008F BODY MASS INDEX DOCD: CPT | Mod: S$GLB,,, | Performed by: NURSE PRACTITIONER

## 2024-08-06 RX ORDER — TRAMADOL HYDROCHLORIDE 50 MG/1
TABLET ORAL
COMMUNITY
Start: 2024-07-30

## 2024-08-06 RX ORDER — MELOXICAM 7.5 MG/1
TABLET ORAL
COMMUNITY
Start: 2024-07-03

## 2024-08-06 RX ORDER — AMLODIPINE BESYLATE 10 MG/1
10 TABLET ORAL DAILY
Qty: 90 TABLET | Refills: 3 | Status: SHIPPED | OUTPATIENT
Start: 2024-08-06